# Patient Record
Sex: FEMALE | Race: WHITE | NOT HISPANIC OR LATINO | Employment: OTHER | URBAN - METROPOLITAN AREA
[De-identification: names, ages, dates, MRNs, and addresses within clinical notes are randomized per-mention and may not be internally consistent; named-entity substitution may affect disease eponyms.]

---

## 2018-01-02 ENCOUNTER — APPOINTMENT (OUTPATIENT)
Dept: ULTRASOUND IMAGING | Facility: CLINIC | Age: 83
End: 2018-01-02
Attending: EMERGENCY MEDICINE
Payer: MEDICARE

## 2018-01-02 ENCOUNTER — HOSPITAL ENCOUNTER (EMERGENCY)
Facility: CLINIC | Age: 83
Discharge: HOME | End: 2018-01-02
Attending: EMERGENCY MEDICINE | Admitting: EMERGENCY MEDICINE
Payer: MEDICARE

## 2018-01-02 VITALS
OXYGEN SATURATION: 97 % | RESPIRATION RATE: 16 BRPM | HEART RATE: 87 BPM | TEMPERATURE: 97.6 F | SYSTOLIC BLOOD PRESSURE: 177 MMHG | DIASTOLIC BLOOD PRESSURE: 82 MMHG

## 2018-01-02 DIAGNOSIS — R60.0 PERIPHERAL EDEMA: ICD-10-CM

## 2018-01-02 PROCEDURE — 93971 EXTREMITY STUDY: CPT | Mod: RT

## 2018-01-02 PROCEDURE — 99284 EMERGENCY DEPT VISIT MOD MDM: CPT | Mod: 25

## 2018-01-02 RX ORDER — FUROSEMIDE 20 MG
20 TABLET ORAL DAILY
Qty: 14 TABLET | Refills: 0 | Status: SHIPPED | OUTPATIENT
Start: 2018-01-02 | End: 2023-01-01

## 2018-01-02 ASSESSMENT — ENCOUNTER SYMPTOMS
COLOR CHANGE: 1
SHORTNESS OF BREATH: 0

## 2018-01-02 NOTE — ED AVS SNAPSHOT
Emergency Department    [address] [place] [address]    Phone:  [phone]    Fax:  [phone]                                       Breanna Her   MRN: [id number]    Department:   Emergency Department   Date of Visit:  1/2/2018           After Visit Summary Signature Page     I have received my discharge instructions, and my questions have been answered. I have discussed any challenges I see with this plan with the nurse or doctor.    ..........................................................................................................................................  Patient/Patient Representative Signature      ..........................................................................................................................................  Patient Representative Print Name and Relationship to Patient    ..................................................               ................................................  Date                                            Time    ..........................................................................................................................................  Reviewed by Signature/Title    ...................................................              ..............................................  Date                                                            Time

## 2018-01-02 NOTE — ED AVS SNAPSHOT
Emergency Department    [address] [facility] [address]    Phone:  [phone]    Fax:  [phone]                                       Breanna Her   MRN: [id number]    Department:   Emergency Department   Date of Visit:  1/2/2018           Patient Information     Date Of Birth          4/13/1924        Your diagnoses for this visit were:     Peripheral edema        You were seen by Jessica Haji MD.      Follow-up Information     Schedule an appointment as soon as possible for a visit with Clinic, [facility].    Contact information:    [address] [address] [phone]          Discharge Instructions       Cut out the salt in your diet, elevate your legs when not walking, Lasix 20 mg in the morning until you see your doctor.  Set an appointment up either Friday or Monday for a recheck.  If you develop fevers with increasing redness, you should be rechecked.        Leg Swelling in Both Legs    Swelling of the feet, ankles, and legs is called edema. It is caused by excess fluid that has collected in the tissues. Extra fluid in the body settles in the lowest part because of gravity. This is why the legs and feet are most affected.  Some of the causes for edema include:    Disease of the heart like congestive heart failure    Standing or sitting for long periods of time    Infection of the feet or legs    Blood pooling in the veins of your legs (venous insufficiency)    Dilated veins in your lower leg (varicose veins)    Garters or other clothing that is tight on your legs. This will cause blood to pool in your legs because the clothing limits blood flow.    Some medicines such as hormones like birth control pills, some blood pressure medicines like calcium channel blockers (amlodipine) and steroids, some antidepressants like MAO inhibitors and tricyclics    Menstrual periods that cause you to retain fluids    Many types of renal disease    Liver  failure or cirrhosis    Pregnancy, some swelling is normal, but a sudden increase in leg swelling or weight gain can be a sign of a dangerous complication of pregnancy    Poor nutrition    Thyroid disease  Medical treatment will depend on what is causing the swelling in your legs. Your healthcare provider may prescribe water pills (diuretics) to get rid of the extra fluid.  Home care  Follow these guidelines when caring for yourself at home:    Don't wear clothing like garters that is tight on your legs.    Keep your legs up while lying or sitting.    If infection, injury, or recent surgery is causing the swelling, stay off your legs as much as possible until symptoms get better.    If your healthcare provider says that your leg swelling is caused by venous insufficiency or varicose veins, don't sit or  one place for long periods of time. Take breaks and walk about every few hours. Brisk walking is a good exercise. It helps circulate the blood that has collected in your leg. Talk with your provider about using support stockings to stop daytime leg swelling.    If your provider says that heart disease is causing your leg swelling, follow a low-salt diet to stop extra fluid from staying in your body. You may also need medicine.  Follow-up care  Follow up with your healthcare provider, or as advised.  When to seek medical advice  Call your healthcare provider right away if any of these occur:    New shortness of breath or chest pain    Shortness of breath or chest pain that gets worse    Swelling in both legs or ankles that gets worse    Swelling of the abdomen    Redness, warmth, or swelling in one leg    Fever of 100.4 F (38 C) or higher, or as directed by your healthcare provider    Yellow color to your skin or eyes    Rapid, unexplained weight gain    Having to sleep upright or use an increased number of pillows  Date Last Reviewed: 3/31/2016    8656-9397 [employer]. [address] [facility],  [place] [address]. All rights reserved. This information is not intended as a substitute for professional medical care. Always follow your healthcare professional's instructions.          24 Hour Appointment Hotline       To make an appointment at any [facility], call [phone] ([phone]). If you don't have a family doctor or clinic, we will help you find one. [facility] clinics are conveniently located to serve the needs of you and your family.             Review of your medicines      CONTINUE these medicines which may have CHANGED, or have new prescriptions. If we are uncertain of the size of tablets/capsules you have at home, strength may be listed as something that might have changed.        Dose / Directions Last dose taken    * LASIX PO   Dose:  20 mg   What changed:  Another medication with the same name was added. Make sure you understand how and when to take each.        Take 20 mg by mouth daily Pt takes on prn basis for edema   Refills:  0        * furosemide 20 MG tablet   Commonly known as:  LASIX   Dose:  20 mg   What changed:  You were already taking a medication with the same name, and this prescription was added. Make sure you understand how and when to take each.   Quantity:  14 tablet        Take 1 tablet (20 mg) by mouth daily   Refills:  0        * Notice:  This list has 2 medication(s) that are the same as other medications prescribed for you. Read the directions carefully, and ask your doctor or other care provider to review them with you.      Our records show that you are taking the medicines listed below. If these are incorrect, please call your family doctor or clinic.        Dose / Directions Last dose taken    ASPIRIN PO   Dose:  81 mg        Take 81 mg by mouth daily.   Refills:  0        FERROUS SULFATE   Dose:  65 mg        65 mg daily.   Refills:  0        HYDROcodone-acetaminophen 5-325 MG per tablet   Commonly known as:  NORCO   Dose:  1 tablet   Quantity:  15 tablet  "       Take 1 tablet by mouth every 6 hours as needed for moderate to severe pain   Refills:  0        LISINOPRIL PO   Dose:  20 mg        Take 20 mg by mouth daily.   Refills:  0        Multi-vitamin Tabs tablet   Dose:  1 tablet        Take 1 tablet by mouth daily.   Refills:  0        nitroFURantoin (macrocrystal-monohydrate) 100 MG capsule   Commonly known as:  MACROBID   Dose:  100 mg   Quantity:  6 capsule        Take 1 capsule (100 mg) by mouth 2 times daily   Refills:  0        oxybutynin 3.9 MG/24HR BIW patch   Commonly known as:  OXYTROL   Dose:  1 patch        Place 1 patch onto the skin twice a week.   Refills:  0        pentosan polysulfate 100 MG capsule   Commonly known as:  ELMIRON   Dose:  100 mg   Quantity:  60 capsule        Take 1 capsule (100 mg) by mouth 2 times daily   Refills:  3        PRILOSEC PO   Dose:  20 mg        Take 20 mg by mouth every morning.   Refills:  0        PSYLLIUM PO   Dose:  1 tsp.        Take 1 tsp by mouth daily as needed.   Refills:  0        REQUIP PO   Dose:  3 mg        Take 3 mg by mouth   Refills:  0        senna-docusate 8.6-50 MG per tablet   Commonly known as:  SENOKOT-S;PERICOLACE   Dose:  1 tablet        Take 1 tablet by mouth daily.   Refills:  0        TENORMIN PO   Dose:  50 mg        Take 50 mg by mouth 2 times daily.   Refills:  0        TRAMADOL HCL PO   Dose:  50 mg        Take 50 mg by mouth every 6 hours as needed for moderate to severe pain   Refills:  0        TRIMETHOPRIM PO   Dose:  100 mg        Take 100 mg by mouth daily.   Refills:  0        UNKNOWN TO PATIENT        \"low dose antibiotic for UTI   Refills:  0                Prescriptions were sent or printed at these locations (1 Prescription)                   Other Prescriptions                Printed at Department/Unit printer (1 of 1)         furosemide (LASIX) 20 MG tablet                Procedures and tests performed during your visit     US Lower Extremity Venous Duplex Right    "   Orders Needing Specimen Collection     None      Pending Results     No orders found from 12/31/2017 to 1/3/2018.            Pending Culture Results     No orders found from 12/31/2017 to 1/3/2018.            Pending Results Instructions     If you had any lab results that were not finalized at the time of your Discharge, you can call the ED Lab Result RN at [phone]. You will be contacted by this team for any positive Lab results or changes in treatment. The nurses are available 7 days a week from 10A to 6:30P.  You can leave a message 24 hours per day and they will return your call.        Test Results From Your Hospital Stay        1/2/2018  9:56 PM      Narrative     US LOWER EXTREMITY VENOUS DUPLEX RIGHT   1/2/2018 9:54 PM     HISTORY: Right leg pain, swelling and redness.    COMPARISON: None.    FINDINGS: Gray-scale, color and Doppler spectral analysis ultrasound  was performed of the right leg. Compression and augmentation imaging  was performed.    There is no evidence for deep venous thrombosis. The veins compress  and augment normally.        Impression     IMPRESSION: No DVT.     ANDREA POWELL MD                Clinical Quality Measure: Blood Pressure Screening     Your blood pressure was checked while you were in the emergency department today. The last reading we obtained was  BP: 169/78 . Please read the guidelines below about what these numbers mean and what you should do about them.  If your systolic blood pressure (the top number) is less than 120 and your diastolic blood pressure (the bottom number) is less than 80, then your blood pressure is normal. There is nothing more that you need to do about it.  If your systolic blood pressure (the top number) is 120-139 or your diastolic blood pressure (the bottom number) is 80-89, your blood pressure may be higher than it should be. You should have your blood pressure rechecked within a year by a primary care provider.  If your systolic blood  "pressure (the top number) is 140 or greater or your diastolic blood pressure (the bottom number) is 90 or greater, you may have high blood pressure. High blood pressure is treatable, but if left untreated over time it can put you at risk for heart attack, stroke, or kidney failure. You should have your blood pressure rechecked by a primary care provider within the next 4 weeks.  If your provider in the emergency department today gave you specific instructions to follow-up with your doctor or provider even sooner than that, you should follow that instruction and not wait for up to 4 weeks for your follow-up visit.        Thank you for choosing [facility]       Thank you for choosing [facility] for your care. Our goal is always to provide you with excellent care. Hearing back from our patients is one way we can continue to improve our services. Please take a few minutes to complete the written survey that you may receive in the mail after you visit with us. Thank you!        [email or web address] Information     [employer] lets you send messages to your doctor, view your test results, renew your prescriptions, schedule appointments and more. To sign up, go to [email or web address] . Click on \"Log in\" on the left side of the screen, which will take you to the Welcome page. Then click on \"Sign up Now\" on the right side of the page.     You will be asked to enter the access code listed below, as well as some personal information. Please follow the directions to create your username and password.     Your access code is: [email or web address]  Expires: 2018 10:12 PM     Your access code will  in 90 days. If you need help or a new code, please call your [facility] clinic or [phone].        Care EveryWhere ID     This is your Care EveryWhere ID. This could be used by other organizations to access your [facility] medical records  [id number]        Equal Access to Services     CARLEY LAZO AH: atif Gonzalez, " anna martinez ah. So [facility] [phone].    ATENCIÓN: Si habla greciaañol, tiene a borjas disposición servicios gratuitos de asistencia lingüística. Llame al [phone].    We comply with applicable federal civil rights laws and [place] laws. We do not discriminate on the basis of race, color, national origin, age, disability, sex, sexual orientation, or gender identity.            After Visit Summary       This is your record. Keep this with you and show to your community pharmacist(s) and doctor(s) at your next visit.

## 2018-01-03 NOTE — DISCHARGE INSTRUCTIONS
Cut out the salt in your diet, elevate your legs when not walking, Lasix 20 mg in the morning until you see your doctor.  Set an appointment up either Friday or Monday for a recheck.  If you develop fevers with increasing redness, you should be rechecked.        Leg Swelling in Both Legs    Swelling of the feet, ankles, and legs is called edema. It is caused by excess fluid that has collected in the tissues. Extra fluid in the body settles in the lowest part because of gravity. This is why the legs and feet are most affected.  Some of the causes for edema include:    Disease of the heart like congestive heart failure    Standing or sitting for long periods of time    Infection of the feet or legs    Blood pooling in the veins of your legs (venous insufficiency)    Dilated veins in your lower leg (varicose veins)    Garters or other clothing that is tight on your legs. This will cause blood to pool in your legs because the clothing limits blood flow.    Some medicines such as hormones like birth control pills, some blood pressure medicines like calcium channel blockers (amlodipine) and steroids, some antidepressants like MAO inhibitors and tricyclics    Menstrual periods that cause you to retain fluids    Many types of renal disease    Liver failure or cirrhosis    Pregnancy, some swelling is normal, but a sudden increase in leg swelling or weight gain can be a sign of a dangerous complication of pregnancy    Poor nutrition    Thyroid disease  Medical treatment will depend on what is causing the swelling in your legs. Your healthcare provider may prescribe water pills (diuretics) to get rid of the extra fluid.  Home care  Follow these guidelines when caring for yourself at home:    Don't wear clothing like garters that is tight on your legs.    Keep your legs up while lying or sitting.    If infection, injury, or recent surgery is causing the swelling, stay off your legs as much as possible until symptoms get  better.    If your healthcare provider says that your leg swelling is caused by venous insufficiency or varicose veins, don't sit or  one place for long periods of time. Take breaks and walk about every few hours. Brisk walking is a good exercise. It helps circulate the blood that has collected in your leg. Talk with your provider about using support stockings to stop daytime leg swelling.    If your provider says that heart disease is causing your leg swelling, follow a low-salt diet to stop extra fluid from staying in your body. You may also need medicine.  Follow-up care  Follow up with your healthcare provider, or as advised.  When to seek medical advice  Call your healthcare provider right away if any of these occur:    New shortness of breath or chest pain    Shortness of breath or chest pain that gets worse    Swelling in both legs or ankles that gets worse    Swelling of the abdomen    Redness, warmth, or swelling in one leg    Fever of 100.4 F (38 C) or higher, or as directed by your healthcare provider    Yellow color to your skin or eyes    Rapid, unexplained weight gain    Having to sleep upright or use an increased number of pillows  Date Last Reviewed: 3/31/2016    [id number] [employer]. [address] [place] [address], [place] [address]. All rights reserved. This information is not intended as a substitute for professional medical care. Always follow your healthcare professional's instructions.

## 2018-01-03 NOTE — ED PROVIDER NOTES
History     Chief Complaint:  Leg Swelling      HPI   Breanna Her is a 89 year old female who presents with leg swelling. The patient states that she has been experiencing increased bilateral lower extremity edema for the past couple of weeks, right greater than left. She denies chest pain or shortness of breath.  She does have a history of peripheral edema and had been on Lasix as needed but does not have any at home.  No history of congestive heart failure.  She has not had fever, she does have erythema on both shins but says this is chronic and not new are changed.  She admits to eating more salt in her diet over the holidays.  No history of PE or DVT.    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  Osteoarthrosis, lower leg  Osteoporosis  Restless leg  Self-catheterises urinary bladder  Spinal stenosis, lumbar region, without neurogenic claudication  Stroke  Unspecified essential hypertension  Unspecified urinary incontinence  Lower urinary tract infection    Past Surgical History:    Appendectomy  Cataract iol, right/left  Cholecystectomy  Cystocele repair  Cystoscopy, biopsy bladder, combined  Cystoscopy, retrogrades, combined  Genitourinary surgery- interstim implant  Hysterectomy  Implant stimulator and leads sacral nerve (stage 1 and 2)  Laminectomy lumbar one level    Family History:    History reviewed. No significant family history.     Social History:  Relationship status:   Tobacco Use: Neg (Former Smoker, Quit Date: 1/1/1984)  Alcohol Use: Pos (Rarely)  The patient presents with her friend.  The patient is retired.  The patient lives at home with her son.    Review of Systems   Respiratory: Negative  for shortness of breath.    Cardiovascular: Positive for leg swelling. Negative for chest pain.   Skin: Positive for color change (Redness on bilateral lower legs.).   All other systems reviewed and are negative.    Physical Exam     Patient Vitals for the past 24 hrs:   BP Temp Temp src Pulse Resp SpO2   01/02/18 2219 177/82 - - - - -   01/02/18 2215 - - - - - 97 %   01/02/18 2130 (!) 138/93 - - - - 95 %   01/02/18 1711 169/78 97.6  F (36.4  C) Oral 87 16 99 %     Physical Exam  I reviewed the patient's vital signs and nursing notes.  General: The patient is awake and alert very pleasant.  Pulmonary: Lungs are clear.  Effort is normal.  No wheezing.  Cardiovascular: Heart sounds are normal, good peripheral pulses.  Normal capillary refill in her feet.  Perfusion is normal.  Musculoskeletal: She has 1+ edema in the left lower extremity below the knee and 2+ in the right.  There is some hyperemia of the skin anteriorly bilaterally.  She has tenderness of the skin but the skin is not excessively warm.  No red streaking up the leg.  Neuro: Awake and alert.  Sensation is normal and intact in the lower extremity.  Mentation is normal.  Skin: Hyperemia over the anterior shins bilaterally but not hot.  No other rash noted.    Emergency Department Course   Imaging:  Radiographic findings were communicated with the patient who voiced understanding of the findings.    Right Lower Extremity US, Venous Duplex, per radiology:   No DVT.    Emergency Department Course:  Nursing notes and vitals reviewed.  I performed an exam of the patient as documented above.  The above workup was undertaken.  2206: I asked the patient a follow up question regarding medications that she is taking.  Findings and plan explained to the Patient. Patient discharged home, status improved, with instructions regarding supportive care, medications, and reasons to return as well as the importance of close follow-up was reviewed.    Impression & Plan     Medical Decision Making:  Patient comes in with a history of intermittent leg edema.  The right leg seem to be bigger recently than the left so an ultrasound was obtained and it was negative.  She has hyperemia probably due to the edema but no evidence of infection.  She had a history of being on Lasix as needed so I am going to refill that and have her cut the salt out of her diet.  Over the holidays it sounds like she probably ate more salt than normal.  She will follow-up in the clinic later this week or early next week for a recheck.    Diagnosis:    ICD-10-CM   1. Peripheral edema R60.9     Disposition:  Discharged to home with Lasix.    Discharge Medications:   furosemide (LASIX) 20 MG tablet Take 1 tablet (20 mg) by mouth daily, Disp-14 tablet, R-0, Local Print     Alem HAMMOND, am serving as a scribe on 1/2/2018 at 9:57 PM to personally document services performed by Jessica Haji MD, based on my observations and the provider's statements to me.     EMERGENCY DEPARTMENT       Jessica Haji MD  01/02/18 4015

## 2018-05-08 ENCOUNTER — HOSPITAL ENCOUNTER (EMERGENCY)
Facility: CLINIC | Age: 83
Discharge: HOME | End: 2018-05-08
Attending: EMERGENCY MEDICINE | Admitting: EMERGENCY MEDICINE
Payer: MEDICARE

## 2018-05-08 ENCOUNTER — APPOINTMENT (OUTPATIENT)
Dept: GENERAL RADIOLOGY | Facility: CLINIC | Age: 83
End: 2018-05-08
Attending: EMERGENCY MEDICINE
Payer: MEDICARE

## 2018-05-08 VITALS
SYSTOLIC BLOOD PRESSURE: 151 MMHG | RESPIRATION RATE: 16 BRPM | TEMPERATURE: 98.4 F | OXYGEN SATURATION: 96 % | HEIGHT: 62 IN | DIASTOLIC BLOOD PRESSURE: 82 MMHG | BODY MASS INDEX: 20.43 KG/M2 | WEIGHT: 111 LBS

## 2018-05-08 DIAGNOSIS — R07.9 CHEST PAIN, UNSPECIFIED TYPE: ICD-10-CM

## 2018-05-08 LAB
ANION GAP SERPL CALCULATED.3IONS-SCNC: 4 MMOL/L (ref 3–14)
BASOPHILS # BLD AUTO: 0 10E9/L (ref 0–0.2)
BASOPHILS NFR BLD AUTO: 0.2 %
BUN SERPL-MCNC: 19 MG/DL (ref 7–30)
CALCIUM SERPL-MCNC: 8.8 MG/DL (ref 8.5–10.1)
CHLORIDE SERPL-SCNC: 109 MMOL/L (ref 94–109)
CO2 SERPL-SCNC: 27 MMOL/L (ref 20–32)
CREAT SERPL-MCNC: 0.78 MG/DL (ref 0.52–1.04)
DIFFERENTIAL METHOD BLD: ABNORMAL
EOSINOPHIL # BLD AUTO: 0.1 10E9/L (ref 0–0.7)
EOSINOPHIL NFR BLD AUTO: 1.3 %
ERYTHROCYTE [DISTWIDTH] IN BLOOD BY AUTOMATED COUNT: 15.1 % (ref 10–15)
GFR SERPL CREATININE-BSD FRML MDRD: 68 ML/MIN/1.7M2
GLUCOSE SERPL-MCNC: 86 MG/DL (ref 70–99)
HCT VFR BLD AUTO: 34.4 % (ref 35–47)
HGB BLD-MCNC: 11.3 G/DL (ref 11.7–15.7)
IMM GRANULOCYTES # BLD: 0 10E9/L (ref 0–0.4)
IMM GRANULOCYTES NFR BLD: 0.2 %
INTERPRETATION ECG - MUSE: NORMAL
LYMPHOCYTES # BLD AUTO: 1.7 10E9/L (ref 0.8–5.3)
LYMPHOCYTES NFR BLD AUTO: 26.8 %
MCH RBC QN AUTO: 31.7 PG (ref 26.5–33)
MCHC RBC AUTO-ENTMCNC: 32.8 G/DL (ref 31.5–36.5)
MCV RBC AUTO: 97 FL (ref 78–100)
MONOCYTES # BLD AUTO: 0.5 10E9/L (ref 0–1.3)
MONOCYTES NFR BLD AUTO: 8.4 %
NEUTROPHILS # BLD AUTO: 3.9 10E9/L (ref 1.6–8.3)
NEUTROPHILS NFR BLD AUTO: 63.1 %
NRBC # BLD AUTO: 0 10*3/UL
NRBC BLD AUTO-RTO: 0 /100
PLATELET # BLD AUTO: 180 10E9/L (ref 150–450)
POTASSIUM SERPL-SCNC: 4.7 MMOL/L (ref 3.4–5.3)
RBC # BLD AUTO: 3.56 10E12/L (ref 3.8–5.2)
SODIUM SERPL-SCNC: 140 MMOL/L (ref 133–144)
TROPONIN I SERPL-MCNC: <0.015 UG/L (ref 0–0.04)
WBC # BLD AUTO: 6.2 10E9/L (ref 4–11)

## 2018-05-08 PROCEDURE — 93005 ELECTROCARDIOGRAM TRACING: CPT

## 2018-05-08 PROCEDURE — A9270 NON-COVERED ITEM OR SERVICE: HCPCS | Mod: GY | Performed by: EMERGENCY MEDICINE

## 2018-05-08 PROCEDURE — 71046 X-RAY EXAM CHEST 2 VIEWS: CPT

## 2018-05-08 PROCEDURE — 84484 ASSAY OF TROPONIN QUANT: CPT | Performed by: EMERGENCY MEDICINE

## 2018-05-08 PROCEDURE — 25000132 ZZH RX MED GY IP 250 OP 250 PS 637: Mod: GY | Performed by: EMERGENCY MEDICINE

## 2018-05-08 PROCEDURE — 85025 COMPLETE CBC W/AUTO DIFF WBC: CPT | Performed by: EMERGENCY MEDICINE

## 2018-05-08 PROCEDURE — 80048 BASIC METABOLIC PNL TOTAL CA: CPT | Performed by: EMERGENCY MEDICINE

## 2018-05-08 PROCEDURE — 99285 EMERGENCY DEPT VISIT HI MDM: CPT | Mod: 25

## 2018-05-08 RX ORDER — ASPIRIN 81 MG/1
162 TABLET, CHEWABLE ORAL ONCE
Status: COMPLETED | OUTPATIENT
Start: 2018-05-08 | End: 2018-05-08

## 2018-05-08 RX ADMIN — ASPIRIN 81 MG 162 MG: 81 TABLET ORAL at 17:00

## 2018-05-08 ASSESSMENT — ENCOUNTER SYMPTOMS
DIAPHORESIS: 0
SHORTNESS OF BREATH: 0
FEVER: 0
NAUSEA: 0

## 2018-05-08 NOTE — ED PROVIDER NOTES
"  History     Chief Complaint:  Jaw Pain    The history is provided by the patient.      Breanna Her is a 89 year old female who presents to the emergency department today for evaluation of jaw pain. Waking up this morning, the patient felt fine and had no symptoms. Around 1300, prior to doing PT, the patient began having a funny feeling in her chest and pain in her upper jaw/teeth. The nurse thought she looked somewhat pale, and so she took 2 nitroglycerin, which also resolved her symptoms. She was told to go to  however, who performed an ECG and sent her here to the emergency department. She denies shortness of breath, nausea, and sweating, and has had no stents placed in her heart.     Allergies:  Penicillins     Medications:    Aspirin PO - not currently taking daily as of 05/08/18  Nitroglycerin    Past Medical History:    Hx of UTIs  Heart problems  Kidney infection  Anemia  Stent between bladder and kidneys    Past Surgical History:    History reviewed. No pertinent surgical history.    Family History:    History reviewed. No pertinent family history.    Social History:  The patient was accompanied to the ED by her daughter.  Smoking Status: Former Smoker  Alcohol Use: Positive - rare  Marital Status:       Review of Systems   Constitutional: Negative for diaphoresis and fever.   HENT:        Jaw pain - upper   Respiratory: Negative for shortness of breath.    Cardiovascular: Positive for chest pain (resolved).   Gastrointestinal: Negative for nausea.   All other systems reviewed and are negative.    Physical Exam     Patient Vitals for the past 24 hrs:   BP Temp Temp src Heart Rate Resp SpO2 Height Weight   05/08/18 1733 - - - 74 16 96 % - -   05/08/18 1600 151/82 98.4  F (36.9  C) Oral 73 18 95 % 1.575 m (5' 2\") 50.3 kg (111 lb)      Physical Exam  General: Appears well-developed and well-nourished.   Head: No signs of trauma.   CV: Normal rate and regular rhythm.    Resp: Effort normal and " breath sounds normal. No respiratory distress.   GI: Soft. There is no tenderness.  No rebound or guarding.  Normal bowel sounds.  No CVA tenderness.  MSK: Normal range of motion. no edema. No Calf tenderness.  Neuro: The patient is alert and oriented.  Speech normal.  GCS 15  Skin: Skin is warm and dry. No rash noted.   Psych: normal mood and affect. behavior is normal.       Emergency Department Course     ECG:  ECG taken at 1553, ECG read at 1604  Normal sinus rhythm  Possible left atrial enlargement  Borderline ECG  Rate 77 bpm. NJ interval 148 ms. QRS duration 80 ms. QT/QTc 396/448 ms. P-R-T axes 57 -29 45.    Imaging:  Radiology findings were communicated with the patient who voiced understanding of the findings.    Chest XR,  PA & LAT  1. No active areas of infiltrate.  2. Thoracic compression fractures of indeterminate age.      Reading per radiology    Laboratory:  Laboratory findings were communicated with the patient who voiced understanding of the findings.    CBC: WBC 6.2, HGB 11.3,   BMP: o/w WNL (Creatinine 0.78)  Troponin I (1622): <0.015    Interventions:  1700 Aspirin 162 mg PO    Emergency Department Course:    1600 Nursing notes and vitals reviewed.    1605 I performed an exam of the patient as documented above.     1618 The patient was sent for a XR while in the emergency department, results above.      1623 IV was inserted and blood was drawn for laboratory testing, results above.     1715 I personally reviewed the imaging and lab results with the patient and answered all related questions prior to discharge.    Impression & Plan      Medical Decision Making:  Breanna Her is a 89 year old female who presents to the emergency department today for evaluation of chest pain.  This afternoon she developed some pain in the chest along with some sensation around her mouth.  She had a home health nurse at the time recommend that she come to the hospital.  She had actually gone to urgent  care first and sent her to the ER.  On my evaluation, patient said that she was actually symptom-free.  Her exam was very benign.  EKG is obtained did not show any concerning ST segment changes.  Blood work was obtained and showed a negative troponin.  Chest x-ray was unremarkable.  Patient continued to be symptom-free throughout her time the ER.  I did discuss with the patient and her daughter disposition options including admission for further cardiac evaluation given her age and symptoms.  Patient very much declined this.  She understood that while the workup was reassuring, cannot completely rule out ACS.  Patient is an excellent historian and has full decision-making capacity.  She was ultimately discharged home with her daughter with instructions to follow the primary care doctor and return to the ER if she has return of symptoms or any further concerns.      Diagnosis:    ICD-10-CM    1. Chest pain, unspecified type R07.9      Disposition:   Discharge    Scribe Disclosure:  STEPHANY, Modesto Cristofer, am serving as a scribe at 3:52 PM on 5/8/2018 to document services personally performed by Griffin Kc MD based on my observations and the provider's statements to me.      EMERGENCY DEPARTMENT       Griffin Kc MD  05/08/18 8562

## 2018-05-08 NOTE — ED AVS SNAPSHOT
Emergency Department    [address] [place] [address]    Phone:  [phone]    Fax:  [phone]                                       Breanna Her   MRN: [id number]    Department:   Emergency Department   Date of Visit:  5/8/2018           After Visit Summary Signature Page     I have received my discharge instructions, and my questions have been answered. I have discussed any challenges I see with this plan with the nurse or doctor.    ..........................................................................................................................................  Patient/Patient Representative Signature      ..........................................................................................................................................  Patient Representative Print Name and Relationship to Patient    ..................................................               ................................................  Date                                            Time    ..........................................................................................................................................  Reviewed by Signature/Title    ...................................................              ..............................................  Date                                                            Time

## 2018-05-08 NOTE — ED AVS SNAPSHOT
Emergency Department    [address] [facility] [address]    Phone:  [phone]    Fax:  [phone]                                       Breanna Her   MRN: [id number]    Department:   Emergency Department   Date of Visit:  5/8/2018           Patient Information     Date Of Birth          4/13/1924        Your diagnoses for this visit were:     Chest pain, unspecified type        You were seen by Griffin Kc MD.      Follow-up Information     [facility].    Contact information:    [address] [place] [address] [phone]          Follow up with  Emergency Department.    Specialty:  EMERGENCY MEDICINE    Why:  As needed, If symptoms worsen    Contact information:    [address] [facility] [id number] [phone]        Discharge Instructions       Discharge Instructions  Chest Pain    You have been seen today for chest pain or discomfort.  At this time, your doctor has found no signs that your chest pain is due to a serious or life-threatening condition, (or you have declined more testing and/or admission to the hospital). However, sometimes there is a serious problem that does not show up right away. Your evaluation today may not be complete and you may need further testing and evaluation.     You need to follow-up with your regular doctor within 3 days.    Return to the Emergency Department if:    Your chest pain changes, gets worse, starts to happen more often, or comes with less activity.    You are short of breath.    You get very weak or tired.    You pass out or faint.    You have any new symptoms, like fever, cough, numb legs, or you cough up blood.    You have anything else that worries you.    Until you follow-up with your regular doctor please do the following:    Take one aspirin daily unless you have an allergy or are told not to by your doctor.    If a stress test appointment has been made, go to the  appointment.    If you have questions, contact your regular doctor.    If your doctor today has told you to follow-up with your regular doctor, it is very important that you make an appointment with your clinic and go to the appointment.  If you do not follow-up with your primary doctor, it may result in missing an important development which could result in permanent injury or disability and/or lasting pain.  If there is any problem keeping your appointment, call your doctor or return to the Emergency Department.    If you were given a prescription for medicine here today, be sure to read all of the information (including the package insert) that comes with your prescription.  This will include important information about the medicine, its side effects, and any warnings that you need to know about.  The pharmacist who fills the prescription can provide more information and answer questions you may have about the medicine.  If you have questions or concerns that the pharmacist cannot address, please call or return to the Emergency Department.     Opioid Medication Information    Pain medications are among the most commonly prescribed medicines, so we are including this information for all our patients. If you did not receive pain medication or get a prescription for pain medicine, you can ignore it.     You may have been given a prescription for an opioid (narcotic) pain medicine and/or have received a pain medicine while here in the Emergency Department. These medicines can make you drowsy or impaired. You must not drive, operate dangerous equipment, or engage in any other dangerous activities while taking these medications. If you drive while taking these medications, you could be arrested for DUI, or driving under the influence. Do not drink any alcohol while you are taking these medications.     Opioid pain medications can cause addiction. If you have a history of chemical dependency of any type, you are at a  higher risk of becoming addicted to pain medications.  Only take these prescribed medications to treat your pain when all other options have been tried. Take it for as short a time and as few doses as possible. Store your pain pills in a secure place, as they are frequently stolen and provide a dangerous opportunity for children or visitors in your house to start abusing these powerful medications. We will not replace any lost or stolen medicine.  As soon as your pain is better, you should flush all your remaining medication.     Many prescription pain medications contain Tylenol  (acetaminophen), including Vicodin , Tylenol #3 , Norco , Lortab , and Percocet .  You should not take any extra pills of Tylenol  if you are using these prescription medications or you can get very sick.  Do not ever take more than 3000 mg of acetaminophen in any 24 hour period.    All opioids tend to cause constipation. Drink plenty of water and eat foods that have a lot of fiber, such as fruits, vegetables, prune juice, apple juice and high fiber cereal.  Take a laxative if you don t move your bowels at least every other day. Miralax , Milk of Magnesia, Colace , or Senna  can be used to keep you regular.      Remember that you can always come back to the Emergency Department if you are not able to see your regular doctor in the amount of time listed above, if you get any new symptoms, or if there is anything that worries you.          24 Hour Appointment Hotline       To make an appointment at any [facility], call [phone] ([phone]). If you don't have a family doctor or clinic, we will help you find one. [facility] clinics are conveniently located to serve the needs of you and your family.             Review of your medicines      Our records show that you are taking the medicines listed below. If these are incorrect, please call your family doctor or clinic.        Dose / Directions Last dose taken    ASPIRIN PO   Dose:   81 mg        Take 81 mg by mouth daily.   Refills:  0        FERROUS SULFATE   Dose:  65 mg        65 mg daily.   Refills:  0        HYDROcodone-acetaminophen 5-325 MG per tablet   Commonly known as:  NORCO   Dose:  1 tablet   Quantity:  15 tablet        Take 1 tablet by mouth every 6 hours as needed for moderate to severe pain   Refills:  0        * LASIX PO   Dose:  20 mg        Take 20 mg by mouth daily Pt takes on prn basis for edema   Refills:  0        * furosemide 20 MG tablet   Commonly known as:  LASIX   Dose:  20 mg   Quantity:  14 tablet        Take 1 tablet (20 mg) by mouth daily   Refills:  0        LISINOPRIL PO   Dose:  20 mg        Take 20 mg by mouth daily.   Refills:  0        Multi-vitamin Tabs tablet   Dose:  1 tablet        Take 1 tablet by mouth daily.   Refills:  0        nitroFURantoin (macrocrystal-monohydrate) 100 MG capsule   Commonly known as:  MACROBID   Dose:  100 mg   Quantity:  6 capsule        Take 1 capsule (100 mg) by mouth 2 times daily   Refills:  0        oxybutynin 3.9 MG/24HR BIW patch   Commonly known as:  OXYTROL   Dose:  1 patch        Place 1 patch onto the skin twice a week.   Refills:  0        pentosan polysulfate 100 MG capsule   Commonly known as:  ELMIRON   Dose:  100 mg   Quantity:  60 capsule        Take 1 capsule (100 mg) by mouth 2 times daily   Refills:  3        PRILOSEC PO   Dose:  20 mg        Take 20 mg by mouth every morning.   Refills:  0        PSYLLIUM PO   Dose:  1 tsp.        Take 1 tsp by mouth daily as needed.   Refills:  0        REQUIP PO   Dose:  3 mg        Take 3 mg by mouth   Refills:  0        senna-docusate 8.6-50 MG per tablet   Commonly known as:  SENOKOT-S;PERICOLACE   Dose:  1 tablet        Take 1 tablet by mouth daily.   Refills:  0        TENORMIN PO   Dose:  50 mg        Take 50 mg by mouth 2 times daily.   Refills:  0        TRAMADOL HCL PO   Dose:  50 mg        Take 50 mg by mouth every 6 hours as needed for moderate to severe pain  "  Refills:  0        TRIMETHOPRIM PO   Dose:  100 mg        Take 100 mg by mouth daily.   Refills:  0        UNKNOWN TO PATIENT        \"low dose antibiotic for UTI   Refills:  0        * Notice:  This list has 2 medication(s) that are the same as other medications prescribed for you. Read the directions carefully, and ask your doctor or other care provider to review them with you.            Procedures and tests performed during your visit     Basic metabolic panel    CBC with platelets + differential    Chest XR,  PA & LAT    EKG 12-lead, tracing only    Troponin I (now)      Orders Needing Specimen Collection     None      Pending Results     Date and Time Order Name Status Description    5/8/2018 1618 Chest XR,  PA & LAT Preliminary             Pending Culture Results     No orders found from 5/6/2018 to 5/9/2018.            Pending Results Instructions     If you had any lab results that were not finalized at the time of your Discharge, you can call the ED Lab Result RN at [phone]. You will be contacted by this team for any positive Lab results or changes in treatment. The nurses are available 7 days a week from 10A to 6:30P.  You can leave a message 24 hours per day and they will return your call.        Test Results From Your Hospital Stay        5/8/2018  4:34 PM      Component Results     Component Value Ref Range & Units Status    WBC 6.2 4.0 - 11.0 10e9/L Final    RBC Count 3.56 (L) 3.8 - 5.2 10e12/L Final    Hemoglobin 11.3 (L) 11.7 - 15.7 g/dL Final    Hematocrit 34.4 (L) 35.0 - 47.0 % Final    MCV 97 78 - 100 fl Final    MCH 31.7 26.5 - 33.0 pg Final    MCHC 32.8 31.5 - 36.5 g/dL Final    RDW 15.1 (H) 10.0 - 15.0 % Final    Platelet Count 180 150 - 450 10e9/L Final    Diff Method Automated Method  Final    % Neutrophils 63.1 % Final    % Lymphocytes 26.8 % Final    % Monocytes 8.4 % Final    % Eosinophils 1.3 % Final    % Basophils 0.2 % Final    % Immature Granulocytes 0.2 % Final    Nucleated " RBCs 0 0 /100 Final    Absolute Neutrophil 3.9 1.6 - 8.3 10e9/L Final    Absolute Lymphocytes 1.7 0.8 - 5.3 10e9/L Final    Absolute Monocytes 0.5 0.0 - 1.3 10e9/L Final    Absolute Eosinophils 0.1 0.0 - 0.7 10e9/L Final    Absolute Basophils 0.0 0.0 - 0.2 10e9/L Final    Abs Immature Granulocytes 0.0 0 - 0.4 10e9/L Final    Absolute Nucleated RBC 0.0  Final         5/8/2018  5:03 PM      Component Results     Component Value Ref Range & Units Status    Sodium 140 133 - 144 mmol/L Final    Potassium 4.7 3.4 - 5.3 mmol/L Final    Chloride 109 94 - 109 mmol/L Final    Carbon Dioxide 27 20 - 32 mmol/L Final    Anion Gap 4 3 - 14 mmol/L Final    Glucose 86 70 - 99 mg/dL Final    Urea Nitrogen 19 7 - 30 mg/dL Final    Creatinine 0.78 0.52 - 1.04 mg/dL Final    GFR Estimate 68 >60 mL/min/1.7m2 Final    Non  GFR Calc    GFR Estimate If Black 83 >60 mL/min/1.7m2 Final    African American GFR Calc    Calcium 8.8 8.5 - 10.1 mg/dL Final         5/8/2018  5:07 PM      Component Results     Component Value Ref Range & Units Status    Troponin I ES <0.015 0.000 - 0.045 ug/L Final    The 99th percentile for upper reference range is 0.045 ug/L.  Troponin values   in the range of 0.045 - 0.120 ug/L may be associated with risks of adverse   clinical events.           5/8/2018  4:48 PM      Narrative     XR CHEST 2 VW   5/8/2018 4:46 PM     HISTORY: chest pain;     COMPARISON: None.    FINDINGS: The heart is negative.  Left diaphragm is elevated. Mild  fibrotic changes are seen in the lungs.. The pulmonary vasculature is  normal.  Thoracic compression fractures of uncertain age. Fractures of  T10 and T12 are noted..        Impression     IMPRESSION:   1. No active areas of infiltrate.  2. Thoracic compression fractures of indeterminate age.                    Clinical Quality Measure: Blood Pressure Screening     Your blood pressure was checked while you were in the emergency department today. The last reading we  "obtained was  BP: 151/82 . Please read the guidelines below about what these numbers mean and what you should do about them.  If your systolic blood pressure (the top number) is less than 120 and your diastolic blood pressure (the bottom number) is less than 80, then your blood pressure is normal. There is nothing more that you need to do about it.  If your systolic blood pressure (the top number) is 120-139 or your diastolic blood pressure (the bottom number) is 80-89, your blood pressure may be higher than it should be. You should have your blood pressure rechecked within a year by a primary care provider.  If your systolic blood pressure (the top number) is 140 or greater or your diastolic blood pressure (the bottom number) is 90 or greater, you may have high blood pressure. High blood pressure is treatable, but if left untreated over time it can put you at risk for heart attack, stroke, or kidney failure. You should have your blood pressure rechecked by a primary care provider within the next 4 weeks.  If your provider in the emergency department today gave you specific instructions to follow-up with your doctor or provider even sooner than that, you should follow that instruction and not wait for up to 4 weeks for your follow-up visit.        Thank you for choosing [facility]       Thank you for choosing [facility] for your care. Our goal is always to provide you with excellent care. Hearing back from our patients is one way we can continue to improve our services. Please take a few minutes to complete the written survey that you may receive in the mail after you visit with us. Thank you!        [facility] Information     [employer] lets you send messages to your doctor, view your test results, renew your prescriptions, schedule appointments and more. To sign up, go to [email or web address] . Click on \"Log in\" on the left side of the screen, which will take you to the Welcome page. Then click on \"Sign up Now\" on the " right side of the page.     You will be asked to enter the access code listed below, as well as some personal information. Please follow the directions to create your username and password.     Your access code is: [email or web address]  Expires: 2018  5:18 PM     Your access code will  in 90 days. If you need help or a new code, please call your [facility] clinic or [phone].        Care EveryWhere ID     This is your Care EveryWhere ID. This could be used by other organizations to access your [facility] medical records  [id number]        Equal Access to Services     [facility]: Avni Cisneros, atif fu, dominic saldivar, anna fisher . So [facility] [phone].    ATENCIÓN: Si habla español, tiene a borjas disposición servicios gratuitos de asistencia lingüística. Llame al [phone].    We comply with applicable federal civil rights laws and [place] laws. We do not discriminate on the basis of race, color, national origin, age, disability, sex, sexual orientation, or gender identity.            After Visit Summary       This is your record. Keep this with you and show to your community pharmacist(s) and doctor(s) at your next visit.

## 2022-03-20 ENCOUNTER — APPOINTMENT (OUTPATIENT)
Dept: GENERAL RADIOLOGY | Facility: CLINIC | Age: 87
End: 2022-03-20
Attending: EMERGENCY MEDICINE
Payer: MEDICARE

## 2022-03-20 ENCOUNTER — HOSPITAL ENCOUNTER (EMERGENCY)
Facility: CLINIC | Age: 87
Discharge: HOME | End: 2022-03-20
Attending: EMERGENCY MEDICINE | Admitting: EMERGENCY MEDICINE
Payer: MEDICARE

## 2022-03-20 VITALS
HEIGHT: 57 IN | BODY MASS INDEX: 23.51 KG/M2 | DIASTOLIC BLOOD PRESSURE: 89 MMHG | RESPIRATION RATE: 16 BRPM | HEART RATE: 105 BPM | WEIGHT: 109 LBS | SYSTOLIC BLOOD PRESSURE: 184 MMHG | OXYGEN SATURATION: 97 % | TEMPERATURE: 98.9 F

## 2022-03-20 DIAGNOSIS — S63.502A SPRAIN OF LEFT WRIST, INITIAL ENCOUNTER: ICD-10-CM

## 2022-03-20 PROCEDURE — 99283 EMERGENCY DEPT VISIT LOW MDM: CPT

## 2022-03-20 PROCEDURE — 250N000013 HC RX MED GY IP 250 OP 250 PS 637: Performed by: EMERGENCY MEDICINE

## 2022-03-20 PROCEDURE — 73110 X-RAY EXAM OF WRIST: CPT | Mod: LT

## 2022-03-20 RX ORDER — ACETAMINOPHEN 325 MG/1
650 TABLET ORAL ONCE
Status: COMPLETED | OUTPATIENT
Start: 2022-03-20 | End: 2022-03-20

## 2022-03-20 RX ADMIN — ACETAMINOPHEN 650 MG: 325 TABLET, FILM COATED ORAL at 17:31

## 2022-03-20 NOTE — ED TRIAGE NOTES
Pt was walking back to bed from bathroom last night and tripped and fell. Hurting L wrist. Pain is getting worse.

## 2022-03-21 NOTE — ED PROVIDER NOTES
Visit Date: 03/20/2022    CHIEF COMPLAINT:  Left wrist pain.    HISTORY OF PRESENT ILLNESS:  This is a 89-year-old woman who lost her balance last night and fell down.  The only thing that she hurt is her left wrist.  She states the pain has become more severe during the course of the day.  She denies hitting her head, her neck, hurting her chest, her abdomen or other extremities.  She has not taken anything for the pain or put any cold on it.    PAST MEDICAL HISTORY:  Includes gastroesophageal reflux disease, congestive heart failure, spinal stenosis, hypertension, previous stroke.    MEDICATIONS:    1.  Lasix.  2.  Aspirin.  3.  Tenormin.    4.  Lisinopril.    5.  Omeprazole.    6.  Oxybutynin.    ALLERGIES:  PENICILLIN.    FAMILY AND SOCIAL HISTORY:  , lives with her , who was present.    REVIEW OF SYSTEMS:  As noted.  All other systems negative.    PHYSICAL EXAMINATION:    GENERAL:  Reveals a frail, elderly appearing female, sitting, no respiratory distress.  VITAL SIGNS:  Blood pressure 184/89, temperature 98.9, pulse 105, respirations 16, pulse ox 97% on room air.  MUSCULOSKELETAL:  Left arm there is no tenderness of the shoulder, the clavicle, the upper arm, the elbow or proximal forearm.  There is some mild diffuse tenderness of the dorsum of the wrist with some mild tenderness of the anatomical snuffbox.  There is no deformity.  The patient can dorsi and plantarflex, but with discomfort.  Radial pulses 2+ and she has normal sensation and capillary refill in her fingers.  NEUROLOGIC:  She is awake, alert and oriented appropriately.  GCS is 15.      X-ray of the left wrist was obtained.  This shows significant degenerative changes, no acute fractures noted.    The patient received 2 Tylenol for pain.  I have discussed the patient's care with her, that there is no acute fracture.  This seems more consistent with a wrist sprain; however, given there is snuffbox tenderness there could be an occult  scaphoid fracture.  I have asked nursing to place her in a thumb spica splint.  This is accomplished by using both a regular wrist splint and a separate thumb splint to immobilize both.  I have asked the patient to use cold, elevate and use Tylenol or ibuprofen and to make a followup with her primary in 1 week so repeat x-ray can be obtained.    IMPRESSION:  Left wrist sprain.    PLAN:  As noted above.    Daniel Mathur MD        D: 2022   T: 2022   MT: MIMI    Name:     BIMAL STONE  MRN:      [id number]        Account:    [id number]   :      1924           Visit Date: 2022     Document: [id number]

## 2022-04-21 ENCOUNTER — DOCUMENTATION ONLY (OUTPATIENT)
Dept: EMERGENCY MEDICINE | Facility: CLINIC | Age: 87
End: 2022-04-21
Payer: MEDICARE

## 2022-04-21 DIAGNOSIS — Z53.9 ERRONEOUS ENCOUNTER--DISREGARD: ICD-10-CM

## 2022-04-21 DIAGNOSIS — S63.502A SPRAIN OF LEFT WRIST, INITIAL ENCOUNTER: Primary | ICD-10-CM

## 2022-05-09 ENCOUNTER — DOCUMENTATION ONLY (OUTPATIENT)
Dept: EMERGENCY MEDICINE | Facility: CLINIC | Age: 87
End: 2022-05-09
Payer: MEDICARE

## 2022-05-09 DIAGNOSIS — S63.502A SPRAIN OF LEFT WRIST, INITIAL ENCOUNTER: Primary | ICD-10-CM

## 2023-01-01 ENCOUNTER — APPOINTMENT (OUTPATIENT)
Dept: GENERAL RADIOLOGY | Facility: CLINIC | Age: 88
End: 2023-01-01
Attending: EMERGENCY MEDICINE
Payer: MEDICARE

## 2023-01-01 ENCOUNTER — APPOINTMENT (OUTPATIENT)
Dept: CT IMAGING | Facility: CLINIC | Age: 88
End: 2023-01-01
Attending: EMERGENCY MEDICINE
Payer: MEDICARE

## 2023-01-01 ENCOUNTER — APPOINTMENT (OUTPATIENT)
Dept: GENERAL RADIOLOGY | Facility: CLINIC | Age: 88
End: 2023-01-01
Attending: STUDENT IN AN ORGANIZED HEALTH CARE EDUCATION/TRAINING PROGRAM
Payer: MEDICARE

## 2023-01-01 ENCOUNTER — APPOINTMENT (OUTPATIENT)
Dept: PHYSICAL THERAPY | Facility: CLINIC | Age: 88
End: 2023-01-01
Attending: INTERNAL MEDICINE
Payer: MEDICARE

## 2023-01-01 ENCOUNTER — APPOINTMENT (OUTPATIENT)
Dept: PHYSICAL THERAPY | Facility: CLINIC | Age: 88
End: 2023-01-01
Payer: MEDICARE

## 2023-01-01 ENCOUNTER — HOSPITAL ENCOUNTER (EMERGENCY)
Facility: CLINIC | Age: 88
Discharge: HOME | End: 2023-07-02
Attending: EMERGENCY MEDICINE | Admitting: EMERGENCY MEDICINE
Payer: MEDICARE

## 2023-01-01 ENCOUNTER — LAB REQUISITION (OUTPATIENT)
Dept: LAB | Facility: CLINIC | Age: 88
End: 2023-01-01
Payer: MEDICARE

## 2023-01-01 ENCOUNTER — HOSPITAL ENCOUNTER (OUTPATIENT)
Facility: CLINIC | Age: 88
Setting detail: OBSERVATION
Discharge: NURSING, REHAB OR LONG-TERM CARE | End: 2023-05-17
Attending: EMERGENCY MEDICINE | Admitting: INTERNAL MEDICINE
Payer: MEDICARE

## 2023-01-01 VITALS
TEMPERATURE: 97.2 F | HEART RATE: 85 BPM | SYSTOLIC BLOOD PRESSURE: 134 MMHG | HEIGHT: 58 IN | RESPIRATION RATE: 16 BRPM | DIASTOLIC BLOOD PRESSURE: 79 MMHG | WEIGHT: 97 LBS | BODY MASS INDEX: 20.36 KG/M2 | OXYGEN SATURATION: 95 %

## 2023-01-01 VITALS
HEART RATE: 84 BPM | RESPIRATION RATE: 16 BRPM | OXYGEN SATURATION: 95 % | SYSTOLIC BLOOD PRESSURE: 152 MMHG | DIASTOLIC BLOOD PRESSURE: 80 MMHG | TEMPERATURE: 98.2 F

## 2023-01-01 DIAGNOSIS — E07.9 DISORDER OF THYROID, UNSPECIFIED: ICD-10-CM
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
## 2023-01-01 DIAGNOSIS — S22.32XA CLOSED FRACTURE OF ONE RIB OF LEFT SIDE, INITIAL ENCOUNTER: ICD-10-CM

## 2023-01-01 DIAGNOSIS — S22.080A COMPRESSION FRACTURE OF T12 VERTEBRA, INITIAL ENCOUNTER (H): ICD-10-CM

## 2023-01-01 DIAGNOSIS — E53.8 DEFICIENCY OF OTHER SPECIFIED B GROUP VITAMINS: ICD-10-CM

## 2023-01-01 DIAGNOSIS — I10 ESSENTIAL (PRIMARY) HYPERTENSION: ICD-10-CM

## 2023-01-01 DIAGNOSIS — E11.9 TYPE 2 DIABETES MELLITUS WITHOUT COMPLICATIONS (H): ICD-10-CM

## 2023-01-01 DIAGNOSIS — E55.9 VITAMIN D DEFICIENCY, UNSPECIFIED: ICD-10-CM

## 2023-01-01 DIAGNOSIS — S42.022A CLOSED DISPLACED FRACTURE OF SHAFT OF LEFT CLAVICLE, INITIAL ENCOUNTER: ICD-10-CM

## 2023-01-01 LAB
ALBUMIN SERPL BCG-MCNC: 3.5 G/DL (ref 3.5–5.2)
ALP SERPL-CCNC: 85 U/L (ref 35–104)
ALT SERPL W P-5'-P-CCNC: 5 U/L (ref 0–50)
ANION GAP SERPL CALCULATED.3IONS-SCNC: 10 MMOL/L (ref 7–15)
ANION GAP SERPL CALCULATED.3IONS-SCNC: 10 MMOL/L (ref 7–15)
ANION GAP SERPL CALCULATED.3IONS-SCNC: 12 MMOL/L (ref 7–15)
AST SERPL W P-5'-P-CCNC: 25 U/L (ref 0–45)
ATRIAL RATE - MUSE: 102 BPM
BASOPHILS # BLD AUTO: 0 10E3/UL (ref 0–0.2)
BASOPHILS # BLD AUTO: 0 10E3/UL (ref 0–0.2)
BASOPHILS NFR BLD AUTO: 0 %
BASOPHILS NFR BLD AUTO: 0 %
BILIRUB SERPL-MCNC: 0.4 MG/DL
BUN SERPL-MCNC: 13.2 MG/DL (ref 8–23)
BUN SERPL-MCNC: 19.2 MG/DL (ref 8–23)
BUN SERPL-MCNC: 19.3 MG/DL (ref 8–23)
CALCIUM SERPL-MCNC: 8.5 MG/DL (ref 8.2–9.6)
CALCIUM SERPL-MCNC: 8.7 MG/DL (ref 8.2–9.6)
CALCIUM SERPL-MCNC: 8.9 MG/DL (ref 8.2–9.6)
CHLORIDE SERPL-SCNC: 102 MMOL/L (ref 98–107)
CHLORIDE SERPL-SCNC: 103 MMOL/L (ref 98–107)
CHLORIDE SERPL-SCNC: 107 MMOL/L (ref 98–107)
CREAT SERPL-MCNC: 0.63 MG/DL (ref 0.51–0.95)
CREAT SERPL-MCNC: 0.7 MG/DL (ref 0.51–0.95)
CREAT SERPL-MCNC: 0.74 MG/DL (ref 0.51–0.95)
DEPRECATED CALCIDIOL+CALCIFEROL SERPL-MC: 10 UG/L (ref 20–75)
DEPRECATED HCO3 PLAS-SCNC: 23 MMOL/L (ref 22–29)
DEPRECATED HCO3 PLAS-SCNC: 24 MMOL/L (ref 22–29)
DEPRECATED HCO3 PLAS-SCNC: 26 MMOL/L (ref 22–29)
DIASTOLIC BLOOD PRESSURE - MUSE: NORMAL MMHG
EOSINOPHIL # BLD AUTO: 0 10E3/UL (ref 0–0.7)
EOSINOPHIL # BLD AUTO: 0 10E3/UL (ref 0–0.7)
EOSINOPHIL NFR BLD AUTO: 0 %
EOSINOPHIL NFR BLD AUTO: 0 %
ERYTHROCYTE [DISTWIDTH] IN BLOOD BY AUTOMATED COUNT: 14.2 % (ref 10–15)
ERYTHROCYTE [DISTWIDTH] IN BLOOD BY AUTOMATED COUNT: 14.3 % (ref 10–15)
ERYTHROCYTE [DISTWIDTH] IN BLOOD BY AUTOMATED COUNT: 15.1 % (ref 10–15)
FOLATE SERPL-MCNC: 15.3 NG/ML (ref 4.6–34.8)
GFR SERPL CREATININE-BSD FRML MDRD: 72 ML/MIN/1.73M2
GFR SERPL CREATININE-BSD FRML MDRD: 77 ML/MIN/1.73M2
GFR SERPL CREATININE-BSD FRML MDRD: 79 ML/MIN/1.73M2
GLUCOSE SERPL-MCNC: 116 MG/DL (ref 70–99)
GLUCOSE SERPL-MCNC: 131 MG/DL (ref 70–99)
GLUCOSE SERPL-MCNC: 98 MG/DL (ref 70–99)
HBA1C MFR BLD: 5.8 %
HCT VFR BLD AUTO: 36.5 % (ref 35–47)
HCT VFR BLD AUTO: 38.3 % (ref 35–47)
HCT VFR BLD AUTO: 45.5 % (ref 35–47)
HGB BLD-MCNC: 11.7 G/DL (ref 11.7–15.7)
HGB BLD-MCNC: 12 G/DL (ref 11.7–15.7)
HGB BLD-MCNC: 14.8 G/DL (ref 11.7–15.7)
IMM GRANULOCYTES # BLD: 0 10E3/UL
IMM GRANULOCYTES # BLD: 0 10E3/UL
IMM GRANULOCYTES NFR BLD: 0 %
IMM GRANULOCYTES NFR BLD: 0 %
INTERPRETATION ECG - MUSE: NORMAL
LYMPHOCYTES # BLD AUTO: 1.4 10E3/UL (ref 0.8–5.3)
LYMPHOCYTES # BLD AUTO: 1.5 10E3/UL (ref 0.8–5.3)
LYMPHOCYTES NFR BLD AUTO: 14 %
LYMPHOCYTES NFR BLD AUTO: 18 %
MCH RBC QN AUTO: 31.7 PG (ref 26.5–33)
MCH RBC QN AUTO: 32.4 PG (ref 26.5–33)
MCH RBC QN AUTO: 32.9 PG (ref 26.5–33)
MCHC RBC AUTO-ENTMCNC: 31.3 G/DL (ref 31.5–36.5)
MCHC RBC AUTO-ENTMCNC: 32.1 G/DL (ref 31.5–36.5)
MCHC RBC AUTO-ENTMCNC: 32.5 G/DL (ref 31.5–36.5)
MCV RBC AUTO: 101 FL (ref 78–100)
MCV RBC AUTO: 104 FL (ref 78–100)
MCV RBC AUTO: 99 FL (ref 78–100)
MONOCYTES # BLD AUTO: 0.7 10E3/UL (ref 0–1.3)
MONOCYTES # BLD AUTO: 1.1 10E3/UL (ref 0–1.3)
MONOCYTES NFR BLD AUTO: 13 %
MONOCYTES NFR BLD AUTO: 7 %
NEUTROPHILS # BLD AUTO: 5.9 10E3/UL (ref 1.6–8.3)
NEUTROPHILS # BLD AUTO: 7.5 10E3/UL (ref 1.6–8.3)
NEUTROPHILS NFR BLD AUTO: 69 %
NEUTROPHILS NFR BLD AUTO: 79 %
NRBC # BLD AUTO: 0 10E3/UL
NRBC # BLD AUTO: 0 10E3/UL
NRBC BLD AUTO-RTO: 0 /100
NRBC BLD AUTO-RTO: 0 /100
NT-PROBNP SERPL-MCNC: 1084 PG/ML (ref 0–1800)
P AXIS - MUSE: 52 DEGREES
PLATELET # BLD AUTO: 156 10E3/UL (ref 150–450)
PLATELET # BLD AUTO: 185 10E3/UL (ref 150–450)
PLATELET # BLD AUTO: 309 10E3/UL (ref 150–450)
POTASSIUM SERPL-SCNC: 4 MMOL/L (ref 3.4–5.3)
POTASSIUM SERPL-SCNC: 4.1 MMOL/L (ref 3.4–5.3)
POTASSIUM SERPL-SCNC: 5 MMOL/L (ref 3.4–5.3)
PR INTERVAL - MUSE: 140 MS
PROT SERPL-MCNC: 7 G/DL (ref 6.4–8.3)
QRS DURATION - MUSE: 74 MS
QT - MUSE: 348 MS
QTC - MUSE: 453 MS
R AXIS - MUSE: -37 DEGREES
RBC # BLD AUTO: 3.69 10E6/UL (ref 3.8–5.2)
RBC # BLD AUTO: 3.7 10E6/UL (ref 3.8–5.2)
RBC # BLD AUTO: 4.5 10E6/UL (ref 3.8–5.2)
SODIUM SERPL-SCNC: 138 MMOL/L (ref 136–145)
SODIUM SERPL-SCNC: 139 MMOL/L (ref 136–145)
SODIUM SERPL-SCNC: 140 MMOL/L (ref 136–145)
SYSTOLIC BLOOD PRESSURE - MUSE: NORMAL MMHG
T AXIS - MUSE: 63 DEGREES
TROPONIN T SERPL HS-MCNC: 23 NG/L
TSH SERPL DL<=0.005 MIU/L-ACNC: 1.1 UIU/ML (ref 0.3–4.2)
VENTRICULAR RATE- MUSE: 102 BPM
VIT B12 SERPL-MCNC: 496 PG/ML (ref 232–1245)
WBC # BLD AUTO: 8.6 10E3/UL (ref 4–11)
WBC # BLD AUTO: 8.6 10E3/UL (ref 4–11)
WBC # BLD AUTO: 9.7 10E3/UL (ref 4–11)

## 2023-01-01 PROCEDURE — 97161 PT EVAL LOW COMPLEX 20 MIN: CPT | Mod: GP | Performed by: PHYSICAL THERAPIST

## 2023-01-01 PROCEDURE — 84484 ASSAY OF TROPONIN QUANT: CPT | Performed by: EMERGENCY MEDICINE

## 2023-01-01 PROCEDURE — 82746 ASSAY OF FOLIC ACID SERUM: CPT | Mod: ORL | Performed by: FAMILY MEDICINE

## 2023-01-01 PROCEDURE — G0378 HOSPITAL OBSERVATION PER HR: HCPCS

## 2023-01-01 PROCEDURE — 85025 COMPLETE CBC W/AUTO DIFF WBC: CPT | Performed by: EMERGENCY MEDICINE

## 2023-01-01 PROCEDURE — 99284 EMERGENCY DEPT VISIT MOD MDM: CPT

## 2023-01-01 PROCEDURE — 73030 X-RAY EXAM OF SHOULDER: CPT | Mod: LT

## 2023-01-01 PROCEDURE — 250N000013 HC RX MED GY IP 250 OP 250 PS 637: Performed by: INTERNAL MEDICINE

## 2023-01-01 PROCEDURE — 97530 THERAPEUTIC ACTIVITIES: CPT | Mod: GP

## 2023-01-01 PROCEDURE — 80053 COMPREHEN METABOLIC PANEL: CPT | Mod: ORL | Performed by: FAMILY MEDICINE

## 2023-01-01 PROCEDURE — 72100 X-RAY EXAM L-S SPINE 2/3 VWS: CPT

## 2023-01-01 PROCEDURE — G1010 CDSM STANSON: HCPCS

## 2023-01-01 PROCEDURE — 250N000011 HC RX IP 250 OP 636: Performed by: EMERGENCY MEDICINE

## 2023-01-01 PROCEDURE — 85025 COMPLETE CBC W/AUTO DIFF WBC: CPT | Performed by: INTERNAL MEDICINE

## 2023-01-01 PROCEDURE — 84443 ASSAY THYROID STIM HORMONE: CPT | Mod: ORL | Performed by: FAMILY MEDICINE

## 2023-01-01 PROCEDURE — P9603 ONE-WAY ALLOW PRORATED MILES: HCPCS | Mod: ORL | Performed by: FAMILY MEDICINE

## 2023-01-01 PROCEDURE — 85027 COMPLETE CBC AUTOMATED: CPT | Mod: ORL | Performed by: FAMILY MEDICINE

## 2023-01-01 PROCEDURE — 93005 ELECTROCARDIOGRAM TRACING: CPT

## 2023-01-01 PROCEDURE — 73502 X-RAY EXAM HIP UNI 2-3 VIEWS: CPT

## 2023-01-01 PROCEDURE — 99285 EMERGENCY DEPT VISIT HI MDM: CPT | Mod: 25

## 2023-01-01 PROCEDURE — 97530 THERAPEUTIC ACTIVITIES: CPT | Mod: GP | Performed by: PHYSICAL THERAPIST

## 2023-01-01 PROCEDURE — 73000 X-RAY EXAM OF COLLAR BONE: CPT | Mod: LT

## 2023-01-01 PROCEDURE — 36415 COLL VENOUS BLD VENIPUNCTURE: CPT | Mod: ORL | Performed by: FAMILY MEDICINE

## 2023-01-01 PROCEDURE — 83036 HEMOGLOBIN GLYCOSYLATED A1C: CPT | Mod: ORL | Performed by: FAMILY MEDICINE

## 2023-01-01 PROCEDURE — 36415 COLL VENOUS BLD VENIPUNCTURE: CPT | Performed by: INTERNAL MEDICINE

## 2023-01-01 PROCEDURE — 99239 HOSP IP/OBS DSCHRG MGMT >30: CPT | Performed by: INTERNAL MEDICINE

## 2023-01-01 PROCEDURE — 73070 X-RAY EXAM OF ELBOW: CPT | Mod: LT

## 2023-01-01 PROCEDURE — 82607 VITAMIN B-12: CPT | Mod: ORL | Performed by: FAMILY MEDICINE

## 2023-01-01 PROCEDURE — 82306 VITAMIN D 25 HYDROXY: CPT | Mod: ORL | Performed by: FAMILY MEDICINE

## 2023-01-01 PROCEDURE — 96374 THER/PROPH/DIAG INJ IV PUSH: CPT

## 2023-01-01 PROCEDURE — 36415 COLL VENOUS BLD VENIPUNCTURE: CPT | Performed by: EMERGENCY MEDICINE

## 2023-01-01 PROCEDURE — 99231 SBSQ HOSP IP/OBS SF/LOW 25: CPT | Performed by: HOSPITALIST

## 2023-01-01 PROCEDURE — 80048 BASIC METABOLIC PNL TOTAL CA: CPT | Performed by: EMERGENCY MEDICINE

## 2023-01-01 PROCEDURE — 99223 1ST HOSP IP/OBS HIGH 75: CPT | Mod: A1 | Performed by: INTERNAL MEDICINE

## 2023-01-01 PROCEDURE — 96376 TX/PRO/DX INJ SAME DRUG ADON: CPT

## 2023-01-01 PROCEDURE — 71045 X-RAY EXAM CHEST 1 VIEW: CPT

## 2023-01-01 PROCEDURE — 99232 SBSQ HOSP IP/OBS MODERATE 35: CPT | Performed by: INTERNAL MEDICINE

## 2023-01-01 PROCEDURE — 99232 SBSQ HOSP IP/OBS MODERATE 35: CPT | Performed by: PHYSICIAN ASSISTANT

## 2023-01-01 PROCEDURE — 83880 ASSAY OF NATRIURETIC PEPTIDE: CPT | Performed by: EMERGENCY MEDICINE

## 2023-01-01 PROCEDURE — 71101 X-RAY EXAM UNILAT RIBS/CHEST: CPT | Mod: LT

## 2023-01-01 PROCEDURE — 80048 BASIC METABOLIC PNL TOTAL CA: CPT | Performed by: INTERNAL MEDICINE

## 2023-01-01 RX ORDER — AMOXICILLIN 250 MG
2 CAPSULE ORAL 2 TIMES DAILY PRN
Status: DISCONTINUED | OUTPATIENT
Start: 2023-01-01 | End: 2023-01-01 | Stop reason: HOSPADM

## 2023-01-01 RX ORDER — ONDANSETRON 2 MG/ML
4 INJECTION INTRAMUSCULAR; INTRAVENOUS EVERY 6 HOURS PRN
Status: DISCONTINUED | OUTPATIENT
Start: 2023-01-01 | End: 2023-01-01 | Stop reason: HOSPADM

## 2023-01-01 RX ORDER — PROCHLORPERAZINE 25 MG
12.5 SUPPOSITORY, RECTAL RECTAL EVERY 12 HOURS PRN
Status: DISCONTINUED | OUTPATIENT
Start: 2023-01-01 | End: 2023-01-01 | Stop reason: HOSPADM

## 2023-01-01 RX ORDER — LIDOCAINE 4 G/G
1 PATCH TOPICAL EVERY 24 HOURS
Qty: 10 PATCH | Refills: 0 | DISCHARGE
Start: 2023-01-01 | End: 2023-01-01

## 2023-01-01 RX ORDER — AMOXICILLIN 250 MG
1 CAPSULE ORAL 2 TIMES DAILY PRN
Status: DISCONTINUED | OUTPATIENT
Start: 2023-01-01 | End: 2023-01-01 | Stop reason: HOSPADM

## 2023-01-01 RX ORDER — FENTANYL CITRATE 50 UG/ML
50 INJECTION, SOLUTION INTRAMUSCULAR; INTRAVENOUS ONCE
Status: COMPLETED | OUTPATIENT
Start: 2023-01-01 | End: 2023-01-01

## 2023-01-01 RX ORDER — PROCHLORPERAZINE MALEATE 5 MG
5 TABLET ORAL EVERY 6 HOURS PRN
Status: DISCONTINUED | OUTPATIENT
Start: 2023-01-01 | End: 2023-01-01 | Stop reason: HOSPADM

## 2023-01-01 RX ORDER — ACETAMINOPHEN 500 MG
1000 TABLET ORAL 3 TIMES DAILY
DISCHARGE
Start: 2023-01-01

## 2023-01-01 RX ORDER — PANTOPRAZOLE SODIUM 40 MG/1
40 TABLET, DELAYED RELEASE ORAL DAILY PRN
Status: DISCONTINUED | OUTPATIENT
Start: 2023-01-01 | End: 2023-01-01 | Stop reason: HOSPADM

## 2023-01-01 RX ORDER — NALOXONE HYDROCHLORIDE 0.4 MG/ML
0.4 INJECTION, SOLUTION INTRAMUSCULAR; INTRAVENOUS; SUBCUTANEOUS
Status: DISCONTINUED | OUTPATIENT
Start: 2023-01-01 | End: 2023-01-01 | Stop reason: HOSPADM

## 2023-01-01 RX ORDER — NALOXONE HYDROCHLORIDE 0.4 MG/ML
0.2 INJECTION, SOLUTION INTRAMUSCULAR; INTRAVENOUS; SUBCUTANEOUS
Status: DISCONTINUED | OUTPATIENT
Start: 2023-01-01 | End: 2023-01-01 | Stop reason: HOSPADM

## 2023-01-01 RX ORDER — POLYETHYLENE GLYCOL 3350 17 G/17G
17 POWDER, FOR SOLUTION ORAL DAILY PRN
Status: DISCONTINUED | OUTPATIENT
Start: 2023-01-01 | End: 2023-01-01 | Stop reason: HOSPADM

## 2023-01-01 RX ORDER — ONDANSETRON 4 MG/1
4 TABLET, ORALLY DISINTEGRATING ORAL EVERY 6 HOURS PRN
Status: DISCONTINUED | OUTPATIENT
Start: 2023-01-01 | End: 2023-01-01 | Stop reason: HOSPADM

## 2023-01-01 RX ORDER — HYDROCODONE BITARTRATE AND ACETAMINOPHEN 5; 325 MG/1; MG/1
1 TABLET ORAL ONCE
Status: DISCONTINUED | OUTPATIENT
Start: 2023-01-01 | End: 2023-01-01

## 2023-01-01 RX ORDER — LIDOCAINE 4 G/G
1 PATCH TOPICAL
Status: DISCONTINUED | OUTPATIENT
Start: 2023-01-01 | End: 2023-01-01 | Stop reason: HOSPADM

## 2023-01-01 RX ORDER — TRAMADOL HYDROCHLORIDE 50 MG/1
25 TABLET ORAL EVERY 6 HOURS PRN
Qty: 10 TABLET | Refills: 0 | Status: SHIPPED | OUTPATIENT
Start: 2023-01-01

## 2023-01-01 RX ORDER — ACETAMINOPHEN 325 MG/1
650 TABLET ORAL EVERY 6 HOURS PRN
Status: DISCONTINUED | OUTPATIENT
Start: 2023-01-01 | End: 2023-01-01 | Stop reason: HOSPADM

## 2023-01-01 RX ORDER — ACETAMINOPHEN 500 MG
1000 TABLET ORAL 3 TIMES DAILY
Status: DISCONTINUED | OUTPATIENT
Start: 2023-01-01 | End: 2023-01-01 | Stop reason: HOSPADM

## 2023-01-01 RX ORDER — TRAMADOL HYDROCHLORIDE 50 MG/1
25 TABLET ORAL EVERY 6 HOURS PRN
Qty: 10 TABLET | Refills: 0 | Status: SHIPPED | OUTPATIENT
Start: 2023-01-01 | End: 2023-01-01

## 2023-01-01 RX ORDER — HYDROMORPHONE HYDROCHLORIDE 1 MG/ML
.3-.5 INJECTION, SOLUTION INTRAMUSCULAR; INTRAVENOUS; SUBCUTANEOUS EVERY 4 HOURS PRN
Status: DISCONTINUED | OUTPATIENT
Start: 2023-01-01 | End: 2023-01-01 | Stop reason: HOSPADM

## 2023-01-01 RX ORDER — ACETAMINOPHEN 650 MG/1
650 SUPPOSITORY RECTAL EVERY 6 HOURS PRN
Status: DISCONTINUED | OUTPATIENT
Start: 2023-01-01 | End: 2023-01-01 | Stop reason: HOSPADM

## 2023-01-01 RX ORDER — AMOXICILLIN 250 MG
1 CAPSULE ORAL 2 TIMES DAILY PRN
DISCHARGE
Start: 2023-01-01

## 2023-01-01 RX ADMIN — ACETAMINOPHEN 1000 MG: 500 TABLET ORAL at 19:25

## 2023-01-01 RX ADMIN — ACETAMINOPHEN 1000 MG: 500 TABLET ORAL at 14:26

## 2023-01-01 RX ADMIN — ACETAMINOPHEN 1000 MG: 500 TABLET ORAL at 20:06

## 2023-01-01 RX ADMIN — ACETAMINOPHEN 1000 MG: 500 TABLET ORAL at 09:27

## 2023-01-01 RX ADMIN — ACETAMINOPHEN 1000 MG: 500 TABLET ORAL at 08:50

## 2023-01-01 RX ADMIN — ACETAMINOPHEN 1000 MG: 500 TABLET ORAL at 08:43

## 2023-01-01 RX ADMIN — FENTANYL CITRATE 50 MCG: 50 INJECTION, SOLUTION INTRAMUSCULAR; INTRAVENOUS at 19:11

## 2023-01-01 RX ADMIN — ACETAMINOPHEN 1000 MG: 500 TABLET ORAL at 22:08

## 2023-01-01 RX ADMIN — ACETAMINOPHEN 1000 MG: 500 TABLET ORAL at 13:11

## 2023-01-01 RX ADMIN — POLYETHYLENE GLYCOL 3350 17 G: 17 POWDER, FOR SOLUTION ORAL at 11:20

## 2023-01-01 RX ADMIN — LIDOCAINE 1 PATCH: 560 PATCH PERCUTANEOUS; TOPICAL; TRANSDERMAL at 19:25

## 2023-01-01 RX ADMIN — LIDOCAINE 1 PATCH: 560 PATCH PERCUTANEOUS; TOPICAL; TRANSDERMAL at 22:06

## 2023-01-01 RX ADMIN — LIDOCAINE 1 PATCH: 560 PATCH PERCUTANEOUS; TOPICAL; TRANSDERMAL at 19:50

## 2023-01-01 RX ADMIN — TRAMADOL HYDROCHLORIDE 25 MG: 50 TABLET, COATED ORAL at 16:14

## 2023-01-01 RX ADMIN — ACETAMINOPHEN 1000 MG: 500 TABLET ORAL at 19:50

## 2023-01-01 RX ADMIN — FENTANYL CITRATE 50 MCG: 50 INJECTION, SOLUTION INTRAMUSCULAR; INTRAVENOUS at 16:50

## 2023-01-01 RX ADMIN — TRAMADOL HYDROCHLORIDE 25 MG: 50 TABLET, COATED ORAL at 11:38

## 2023-01-01 RX ADMIN — ACETAMINOPHEN 1000 MG: 500 TABLET ORAL at 13:46

## 2023-01-01 RX ADMIN — ACETAMINOPHEN 1000 MG: 500 TABLET ORAL at 08:30

## 2023-01-01 RX ADMIN — ACETAMINOPHEN 1000 MG: 500 TABLET ORAL at 13:06

## 2023-01-01 RX ADMIN — LIDOCAINE 1 PATCH: 560 PATCH PERCUTANEOUS; TOPICAL; TRANSDERMAL at 21:58

## 2023-01-01 ASSESSMENT — ACTIVITIES OF DAILY LIVING (ADL)
ADLS_ACUITY_SCORE: 54
ADLS_ACUITY_SCORE: 50
ADLS_ACUITY_SCORE: 46
ADLS_ACUITY_SCORE: 45
ADLS_ACUITY_SCORE: 45
DEPENDENT_IADLS:: CLEANING;COOKING;LAUNDRY;SHOPPING;MEAL PREPARATION;TRANSPORTATION;INCONTINENCE
ADLS_ACUITY_SCORE: 46
ADLS_ACUITY_SCORE: 50
ADLS_ACUITY_SCORE: 54
ADLS_ACUITY_SCORE: 49
ADLS_ACUITY_SCORE: 45
ADLS_ACUITY_SCORE: 49
ADLS_ACUITY_SCORE: 35
ADLS_ACUITY_SCORE: 50
ADLS_ACUITY_SCORE: 45
ADLS_ACUITY_SCORE: 49
ADLS_ACUITY_SCORE: 50
ADLS_ACUITY_SCORE: 45
ADLS_ACUITY_SCORE: 49
ADLS_ACUITY_SCORE: 45
ADLS_ACUITY_SCORE: 49
ADLS_ACUITY_SCORE: 46
ADLS_ACUITY_SCORE: 35
ADLS_ACUITY_SCORE: 41
ADLS_ACUITY_SCORE: 50
ADLS_ACUITY_SCORE: 35
ADLS_ACUITY_SCORE: 46
ADLS_ACUITY_SCORE: 45
ADLS_ACUITY_SCORE: 50
ADLS_ACUITY_SCORE: 45
ADLS_ACUITY_SCORE: 50
ADLS_ACUITY_SCORE: 46
ADLS_ACUITY_SCORE: 45
ADLS_ACUITY_SCORE: 45
ADLS_ACUITY_SCORE: 41
ADLS_ACUITY_SCORE: 49
ADLS_ACUITY_SCORE: 46
ADLS_ACUITY_SCORE: 45
ADLS_ACUITY_SCORE: 35
ADLS_ACUITY_SCORE: 45
ADLS_ACUITY_SCORE: 49
ADLS_ACUITY_SCORE: 50
ADLS_ACUITY_SCORE: 50
ADLS_ACUITY_SCORE: 45
ADLS_ACUITY_SCORE: 48

## 2023-01-01 ASSESSMENT — ENCOUNTER SYMPTOMS
ARTHRALGIAS: 1
DIZZINESS: 0
NECK PAIN: 1
HEADACHES: 0

## 2023-05-13 PROBLEM — S42.022A CLOSED DISPLACED FRACTURE OF SHAFT OF LEFT CLAVICLE, INITIAL ENCOUNTER: Status: ACTIVE | Noted: 2023-01-01

## 2023-05-13 NOTE — ED NOTES
Bed: ED02  Expected date:   Expected time:   Means of arrival:   Comments:  [place] 89f fall, shoulder

## 2023-05-13 NOTE — ED PROVIDER NOTES
History     Chief Complaint:  Fall       HPI   Breanna Her is a 89 year old female with a history of hypertension, CHF, stroke, osteoporosis who presents via EMS with left shoulder pain after a fall. She rolled and fell off the couch, where she typically sleeps, around 0500 this morning. She denies preceding symptoms including dizziness. She has had left shoulder pain into her collarbone and also reports of neck pain, left hip pain and left rib pain here. Her daughter notes that she has difficulty ambulating. No pain medications were given prior to arrival. Denies headache.     Independent Historian:   Daughter - They report history, see above.    Review of External Notes: Clinic note reviewed from February 1, 2023 when the patient was seen following a fall.  At that time she was noted to have chronic diastolic heart failure and pedal edema.    ROS:  Review of Systems   Musculoskeletal: Positive for arthralgias and neck pain.   Neurological: Negative for dizziness and headaches.   All other systems reviewed and are negative.      Allergies:  Pcn [Penicillins]     Medications:    Aspirin  Atenolol  Lasix  Norco  Lisinopril  Macrobid  Prilosec  Oxytrol  Elmiron  Psyllium  Requip  Senokot  Tramadol  Trimethoprim    Past Medical History:   CHF  Esophageal reflux  Fecal incontinence  Herpes zoster with other nervous system complications  Osteoarthrosis  Osteoporosis  RLS  Spinal stenosis, lumbar  Stroke  Hypertension  Urinary incontinence  Recurrent UTI  Sensorineural hearing loss of both ears  CKD, stage 3  Depression  Hiatal hernia  Non-rheumatic mitral regurgitation  C. difficile colitis  Atrial fibrillation  Pyelonephritis  SBO    Past Surgical History:    Appendectomy  Cataract IOL  Cholecystectomy  Cystocele repair  Cystoscopy, biopsy bladder  Cystoscopy, retrogrades, bilateral  Colonoscopy   Implant stimulator and leads sacral nerve  Hysterectomy  Laminectomy lumbar one level  Right total knee  "arthroplasty    Family History:    Father - heart disease  Mother - colon cancer, hypertension  Sister - breast cancer    Social History:  The patient presents to the ED via EMS with her daughter.  PCP: Kelly Armas     Physical Exam     Patient Vitals for the past 24 hrs:   BP Temp Temp src Pulse Resp SpO2 Height Weight   05/13/23 1634 (!) 177/93 98.6  F (37  C) Oral 98 16 97 % 1.473 m (4' 10\") 47.6 kg (105 lb)        Physical Exam  Constitutional:       General: She is not in acute distress.     Appearance: Normal appearance. She is not diaphoretic.   HENT:      Head: Atraumatic.      Right Ear: External ear normal.      Left Ear: External ear normal.      Mouth/Throat:      Mouth: Mucous membranes are moist.   Eyes:      General: No scleral icterus.     Conjunctiva/sclera: Conjunctivae normal.   Neck:      Comments: Mild diffuse C-spine tenderness without step-off.  Cardiovascular:      Rate and Rhythm: Normal rate and regular rhythm.      Comments: Systolic murmur  Pulmonary:      Effort: No respiratory distress.      Breath sounds: Normal breath sounds.   Abdominal:      General: Abdomen is flat.      Tenderness: There is no abdominal tenderness.   Musculoskeletal:      Cervical back: Neck supple.      Comments: No thoracic tenderness.  Mild lumbar paraspinal tenderness.  There is ecchymosis with swelling and tenderness over the left clavicle.  Limited range of motion of the shoulder.  Pain over the posterior shoulder elicited with tenderness to palpation.  Mild tenderness of the left elbow.  No tenderness of the wrist.  Mild lateral left hip tenderness.  There is full flexion of the hips bilaterally actively.  No tenderness of the knees or ankles.  Trace edema around the ankles.  Mild left upper chest wall tenderness without crepitance.   Skin:     General: Skin is warm.      Capillary Refill: Capillary refill takes less than 2 seconds.      Findings: No rash.   Neurological:      Mental Status: She is " alert.      Comments: Speech is fluent.  No facial asymmetry.  Strength and sensation grossly intact over the extremities   Psychiatric:         Mood and Affect: Mood normal.         Behavior: Behavior normal.           Emergency Department Course   ECG  ECG results from 05/13/23   EKG 12-lead, tracing only     Value    Systolic Blood Pressure     Diastolic Blood Pressure     Ventricular Rate 102    Atrial Rate 102    AR Interval 140    QRS Duration 74        QTc 453    P Axis 52    R AXIS -37    T Axis 63    Interpretation ECG      Sinus tachycardia  Possible Left atrial enlargement  Left axis deviation  Minimal voltage criteria for LVH, may be normal variant ( R in aVL )  Abnormal ECG  When compared with ECG of 08-MAY-2018 15:53,  No significant change was found  Confirmed by GENERATED REPORT, COMPUTER ([id number]),  Fariha Mascorro ([id number]) on 5/13/2023 5:23:50 PM           Imaging:  CT Head w/o Contrast   Final Result   IMPRESSION:   1.  No CT evidence for acute intracranial process.   2.  Brain atrophy and presumed chronic microvascular ischemic changes as above.      CT Cervical Spine w/o Contrast   Final Result   IMPRESSION:   1.  No evidence of an acute displaced fracture of the cervical spine.      XR Elbow Left 2 Views   Final Result   IMPRESSION: Normal joint spaces and alignment. No fracture or joint effusion. Bone demineralization.      XR Lumbar Spine 2/3 Views   Final Result   IMPRESSION: On the frontal view, image quality is significantly degraded by overlapping ribs, obscuring the upper lumbar spine. Mild rightward scoliosis of the upper lumbar spine and levoscoliosis centered at L4-L5. Mild anterior spondylolisthesis at    L3-L4. Otherwise normal alignment.      Mild to moderate superior endplate compression fracture at T12, age-indeterminate. Otherwise normal vertebral body heights. Severe disc space narrowing at L2-L3 and L3-L4 with probable disc space ankylosis. Moderate to severe disc space  narrowing at    other levels. Moderate facet arthropathy. The visualized sacrum and pelvis are unremarkable. Right-sided ureteral stent. Transsacral stimulator lead.      XR Pelvis and Hip Left 2 Views   Final Result   IMPRESSION:   1.  No fracture or joint malalignment.   2.  Mild left hip degenerative arthrosis.   3.  Bone demineralization.   4.  Advanced degenerative changes in the lower lumbar spine noted.   5.  Right ureteral stent.   6.  Stimulator device projecting over the right pelvis, with lead projecting over the sacrum.      XR Shoulder Left 3 Views   Final Result   IMPRESSION:   1.  Acute comminuted fracture of the left lateral clavicle. This includes a moderate-sized butterfly fragment, which demonstrates rotation and displacement.   2.  Normal glenohumeral alignment.   3.  Bone demineralization.   4.  Coarse interstitial opacities in both lungs.      XR Chest 1 View   Final Result   IMPRESSION: Cardiac enlargement with interval increase in pulmonary vascular congestion. Interstitial opacities in lower lungs compatible with edema. Findings compatible with component of CHF/volume overload. Aortic calcification. Increased opacities in    the lung bases likely relating to atelectasis. No definitive evidence for pneumothorax. Partially visualized oblique fracture middle third left clavicle with the superior apex angulation. Marked degenerative changes right shoulder.         Report per radiology    Laboratory:  Labs Ordered and Resulted from Time of ED Arrival to Time of ED Departure   BASIC METABOLIC PANEL - Abnormal       Result Value    Sodium 138      Potassium 5.0      Chloride 102      Carbon Dioxide (CO2) 26      Anion Gap 10      Urea Nitrogen 19.3      Creatinine 0.63      Calcium 8.9      Glucose 116 (*)     GFR Estimate 79     CBC WITH PLATELETS AND DIFFERENTIAL - Abnormal    WBC Count 9.7      RBC Count 4.50      Hemoglobin 14.8      Hematocrit 45.5       (*)     MCH 32.9      MCHC 32.5       RDW 14.3      Platelet Count 156      % Neutrophils 79      % Lymphocytes 14      % Monocytes 7      % Eosinophils 0      % Basophils 0      % Immature Granulocytes 0      NRBCs per 100 WBC 0      Absolute Neutrophils 7.5      Absolute Lymphocytes 1.4      Absolute Monocytes 0.7      Absolute Eosinophils 0.0      Absolute Basophils 0.0      Absolute Immature Granulocytes 0.0      Absolute NRBCs 0.0     TROPONIN T, HIGH SENSITIVITY - Abnormal    Troponin T, High Sensitivity 23 (*)    NT PROBNP INPATIENT - Normal    N terminal Pro BNP Inpatient 1,084          Emergency Department Course & Assessments:           Interventions:  Medications   fentaNYL (PF) (SUBLIMAZE) injection 50 mcg (50 mcg Intravenous $Given 5/13/23 1650)   fentaNYL (PF) (SUBLIMAZE) injection 50 mcg (50 mcg Intravenous $Given 5/13/23 1911)        Assessments:  1621  I obtained history and examined the patient as noted above. C collar was placed.  1843 I rechecked the patient and explained findings.    Independent Interpretation (X-rays, CTs, rhythm strip):  Left shoulder x-ray independently reviewed.  There is an acute, comminuted fracture    Consultations/Discussion of Management or Tests:  1840 I spoke with Dr. Cleveland, orthopedics, regarding the patient.  1859 I spoke with Dr. Myrick, hospitalist, who accepts the patient.    Social Determinants of Health affecting care:   The patient lives with her son who is not able to adequately care for her and help her with her daily activities.  She was not able to walk today due to the pain    Disposition:  The patient was admitted to the hospital under the care of Dr. Myrick.     Impression & Plan      Medical Decision Making:  This patient is a 89-year-old who presents to the emergency department for evaluation of a fall.  She has an acute, comminuted left clavicle fracture.  This was reviewed with orthopedics.  Emergent surgical invention is not indicated.  The patient was placed in a sling.    The  patient has difficulty getting around and is high risk for repeat fall.  She will be admitted for observation, pain management, physical therapy evaluation, and possible TCU placement.        Diagnosis:    ICD-10-CM    1. Closed displaced fracture of shaft of left clavicle, initial encounter  S42.022A       2. Fall, initial encounter  W19.XXXA       3. Compression fracture of T12 vertebra, initial encounter (H)  S22.080A            Scribe Disclosure:  I, Pilar Ash, am serving as a scribe at 4:20 PM on 5/13/2023 to document services personally performed by Kishan Devlin MD based on my observations and the provider's statements to me.     5/13/2023   Kishan Devlin MD McRoberts, Sean Edward, MD  05/13/23 1916

## 2023-05-13 NOTE — ED TRIAGE NOTES
Patient lives with son and sleeps on day bed. At 0500 patient rolled on to the floor and landed on left shoulder causing pain. When EMS arrived patient was incontinent of urine and c/o left shoulder pain.      Triage Assessment     Row Name 05/13/23 4195       Triage Assessment (Adult)    Airway WDL WDL       Respiratory WDL    Respiratory WDL rhythm/pattern    Rhythm/Pattern, Respiratory no shortness of breath reported       Skin Circulation/Temperature WDL    Skin Circulation/Temperature WDL --  bruising to left shoulder       Cardiac WDL    Cardiac WDL rhythm    Pulse Rate & Regularity radial pulse regular       Peripheral/Neurovascular WDL    Peripheral Neurovascular WDL WDL       Cognitive/Neuro/Behavioral WDL    Cognitive/Neuro/Behavioral WDL WDL

## 2023-05-13 NOTE — PHARMACY-ADMISSION MEDICATION HISTORY
Pharmacist Admission Medication History    Admission medication history is complete. The information provided in this note is only as accurate as the sources available at the time of the update.    Medication reconciliation/reorder completed by provider prior to medication history? No    Information Source(s): Family member via in-person    Pertinent Information:     Changes made to PTA medication list:    Added: None    Deleted: None    Changed: None    Medication Affordability:       Allergies reviewed with patient and updates made in EHR: no    Medication History Completed By: Ting Mathew RPH 5/13/2023 6:56 PM    Prior to Admission medications    Medication Sig Last Dose Taking? Auth Provider Long Term End Date   Omeprazole (PRILOSEC PO) Take 20 mg by mouth daily as needed   Reported, Patient     Ting Mathew PharmD

## 2023-05-13 NOTE — ED NOTES
"[facility]  ED Nurse Handoff Report    ED Chief complaint: Fall      ED Diagnosis:   Final diagnoses:   None       Code Status: not addressed at this time    Allergies:   Allergies   Allergen Reactions    Pcn [Penicillins]        Patient Story: Patient lives with son and sleeps on day bed. At 0500 patient rolled on to the floor and landed on left shoulder causing pain. When EMS arrived patient was incontinent of urine and c/o left shoulder pain.     Focused Assessment:  Patient has deformity to left shoulder and bruising to the area. Patient had dried stool and had a large amount of urinary incontinence. Patient c/o left shoulder pain and hip pain.     Treatments and/or interventions provided: IV, labs, fentanyl, CT scan, xray  Patient's response to treatments and/or interventions: decrease in pain    To be done/followed up on inpatient unit:  continue to monitor    Does this patient have any cognitive concerns?: Forgetful    Activity level - Baseline/Home:  Unknown  Activity Level - Current:   Unknown    Patient's Preferred language: English   Needed?: No    Isolation: None and Contact   Infection: Not Applicable  VRE  Patient tested for COVID 19 prior to admission: NO  Bariatric?: No    Vital Signs:   Vitals:    05/13/23 1634   BP: (!) 177/93   Pulse: 98   Resp: 16   Temp: 98.6  F (37  C)   TempSrc: Oral   SpO2: 97%   Weight: 47.6 kg (105 lb)   Height: 1.473 m (4' 10\")       Cardiac Rhythm:     Was the PSS-3 completed:   Yes  What interventions are required if any?               Family Comments: Daughter and son at bedside  OBS brochure/video discussed/provided to patient/family: N/A              Name of person given brochure if not patient: NA              Relationship to patient: NA    For the majority of the shift this patient's behavior was Green.   Behavioral interventions performed were None.    ED NURSE PHONE NUMBER: [phone]         "

## 2023-05-14 NOTE — PLAN OF CARE
VSS on RA.   Alert, oriented x3. Per pt's daughter, pt's mentation waxes and wanes baseline.   Very tired today rested well; very frail. Follows commands and answers questions appropriately, speech slow.   Mepilex placed low mid back and coccyx-(Blanchable redness intact).  L shoulder swelling and bruising Sling in place and slight pillow to support elbow which relieves pain to clavicle  L Clavicle pain controlled w/ tylenol and lidocaine patch-Removed at 1100   Turn/repo as tolerated. Up X2 Pivot to BSC on her Right side of bed only   PIV SL.    Optifoam applied to neck to minimize friction and discomfort. Cardiac diet.   Pills crushed in applesauce. Incontinent of bladder, no BM today  Plan for OT, SW/CC,  and pt stood at bedside. Did not want to sit in chair today  Ortho saw patient and will do conservative intervention.

## 2023-05-14 NOTE — PROGRESS NOTES
OBS GOALS    -diagnostic tests and consults completed and resulted: NOT MET    -vital signs normal or at patient baseline: MET

## 2023-05-14 NOTE — PROGRESS NOTES
[facility]    Medicine Progress Note - Hospitalist Service    Date of Admission:  5/13/2023    Assessment & Plan   Ms. Breanna Her is a 89-year-old female patient history including aortic stenosis; CHF; spinal stenosis; stroke history; and osteoporosis; who presents after suffering a fall and found to have suffered an acute comminuted left clavicle fracture.     On initial evaluation was afebrile, hypertensive.  ECG showed sinus rhythm without acute ischemic changes.  Labs notable for BMP which was unremarkable; CBC unremarkable; N-terminal proBNP 1084, troponin 23.     Chest x-ray showed interval increase in pulmonary vascular congestion and interstitial opacities in the lower lungs compatible with edema; partially visualized oblique fracture middle third left clavicle with superior apex angulation; no pneumothorax.       Left shoulder x-ray showed acute comminuted fracture of the left lateral clavicle with moderate-sized butterfly fragment that demonstrated rotation and displacement. Pelvis/hip x-ray was negative for acute fracture.  Lumbar spine x-rays showed mild to moderate superior endplate compression fracture at T12 age-indeterminate and other chronic findings.  Cervical spine CT was negative for acute fracture.  Head CT was negative for acute findings.        Fall with acute comminuted left clavicle fracture.  T12 compression fracture, possibly chronic.  * Initial presentation as above.  - Start acetaminophen 1000 mg TID; lidocaine patch 4% daily; PRN acetaminophen; PRN tramadol; PRN IV hydromorphone; minimize opioids as able.  - Orthopedic Surgery consult; at this time, it appears that this will be managed nonoperatively.  - PT recommending TCU  -SW consulted    Troponin elevation, suspect demand ischemia (type 2 NSTEMI).  - Continue to monitor clinically (23), no further CP or symptoms concerning for ACS     Possible pulmonary edema on CXR.  Moderate-severe AS.  CHF  (HFpEF).  * Echo 11/2019 showed LVEF 70-75%, grade 1 diastolic dysfunction; probably bicuspid AV with moderate AS, mild-moderate AR; MS, mild MR; moderate-severe TR; ascending aorta 3.9 cm.   * Initial presentation as above. CXR showed interval increase in pulmonary vascular congestion and interstitial opacities in the lower lungs compatible with edema; partially visualized oblique fracture middle third left clavicle with superior apex angulation; no pneumothorax. Pt not hypoxic, no dyspnea, does not appear volume overloaded or in acute CHF on exam.  - Continue to monitor clinically.  - Does not appear that AS is being actively followed.     GERD with hiatal hernia.  - Continue omeprazole/pantoprazole.     Other chronic medical issues:  Spinal stenosis. H/o sacral nerve stimulator, apparently inactive.  Stroke history.  RLS.  OA.  Osteoporosis.  H/o urinary retention.  - Noted.          Diet: Combination Diet Low Saturated Fat Na <2400mg Diet, No Caffeine Diet    DVT Prophylaxis: Low Risk/Ambulatory with no VTE prophylaxis indicated and Pneumatic Compression Devices  Laws Catheter: Not present  Lines: None     Cardiac Monitoring: None  Code Status: No CPR- Do NOT Intubate      Clinically Significant Risk Factors Present on Admission                                Disposition Plan      Expected Discharge Date: 05/15/2023        Discharge Comments: PT/OT/SW to see        The patient's care was discussed with the Attending Physician, Dr. Choi, Care Coordinator/, Patient and RN.    RAMON LingC  Hospitalist Service  [facility]  Securely message with [employer] (more info)  Text page via [employer] Paging/Directory   ______________________________________________________________________    Interval History   Slept well overnight. Pain controlled at rest but worse with movement. Required assist of 2 to stand this morning - feels very unsteady. No CP, SOB or  dizziness.    Physical Exam   Vital Signs: Temp: 98.1  F (36.7  C) Temp src: Oral BP: (!) 145/84 Pulse: 89   Resp: 20 SpO2: 98 % O2 Device: None (Room air)    Weight: 97 lbs 0 oz    General Appearance: A&O x3, NAD, resting in bed  Respiratory: CTA, diminished at bases, no accessory muscle use  Cardiovascular:  rrr s1 s2 II/VI NIVIA no LE edema  GI: soft, BS present  Skin: warm and dry, intact  Other: Normal mood and affect, answers questions appropriately, left arm in sling, LUE neurovascularly intact    Medical Decision Making       30 MINUTES SPENT BY ME on the date of service doing chart review, history, exam, documentation & further activities per the note.      Data     I have personally reviewed the following data over the past 24 hrs:    8.6  \   11.7   / 185     140 107 19.2 /  98   4.0 23 0.70 \       Trop: 23 (H) BNP: 1,084       Imaging results reviewed over the past 24 hrs:   Recent Results (from the past 24 hour(s))   XR Chest 1 View    Narrative    EXAM: XR CHEST 1 VIEW  LOCATION: [facility]  DATE/TIME: 5/13/2023 5:22 PM CDT    INDICATION: Trauma with pain.  COMPARISON: 05/08/2018      Impression    IMPRESSION: Cardiac enlargement with interval increase in pulmonary vascular congestion. Interstitial opacities in lower lungs compatible with edema. Findings compatible with component of CHF/volume overload. Aortic calcification. Increased opacities in   the lung bases likely relating to atelectasis. No definitive evidence for pneumothorax. Partially visualized oblique fracture middle third left clavicle with the superior apex angulation. Marked degenerative changes right shoulder.   XR Shoulder Left 3 Views    Narrative    EXAM: XR SHOULDER LEFT G/E 3 VIEWS  LOCATION: [facility]  DATE/TIME: 5/13/2023 5:24 PM CDT    INDICATION: Left shoulder pain after trauma.  COMPARISON: None.      Impression    IMPRESSION:  1.  Acute comminuted fracture of the left  lateral clavicle. This includes a moderate-sized butterfly fragment, which demonstrates rotation and displacement.  2.  Normal glenohumeral alignment.  3.  Bone demineralization.  4.  Coarse interstitial opacities in both lungs.   XR Pelvis and Hip Left 2 Views    Narrative    EXAM: XR PELVIS AND HIP LEFT 2 VIEWS  LOCATION: [facility]  DATE/TIME: 5/13/2023 5:25 PM CDT    INDICATION: Pelvic and left hip pain after trauma.  COMPARISON: None.      Impression    IMPRESSION:  1.  No fracture or joint malalignment.  2.  Mild left hip degenerative arthrosis.  3.  Bone demineralization.  4.  Advanced degenerative changes in the lower lumbar spine noted.  5.  Right ureteral stent.  6.  Stimulator device projecting over the right pelvis, with lead projecting over the sacrum.   XR Lumbar Spine 2/3 Views    Narrative    EXAM: XR LUMBAR SPINE 2/3 VIEWS  LOCATION: [facility]  DATE/TIME: 5/13/2023 5:25 PM CDT    INDICATION: Trauma.  COMPARISON: None.      Impression    IMPRESSION: On the frontal view, image quality is significantly degraded by overlapping ribs, obscuring the upper lumbar spine. Mild rightward scoliosis of the upper lumbar spine and levoscoliosis centered at L4-L5. Mild anterior spondylolisthesis at   L3-L4. Otherwise normal alignment.    Mild to moderate superior endplate compression fracture at T12, age-indeterminate. Otherwise normal vertebral body heights. Severe disc space narrowing at L2-L3 and L3-L4 with probable disc space ankylosis. Moderate to severe disc space narrowing at   other levels. Moderate facet arthropathy. The visualized sacrum and pelvis are unremarkable. Right-sided ureteral stent. Transsacral stimulator lead.   XR Elbow Left 2 Views    Narrative    EXAM: XR ELBOW LEFT 2 VIEWS  LOCATION: [facility]  DATE/TIME: 5/13/2023 5:26 PM CDT    INDICATION: Left elbow pain after trauma.  COMPARISON: None.      Impression     IMPRESSION: Normal joint spaces and alignment. No fracture or joint effusion. Bone demineralization.   CT Cervical Spine w/o Contrast    Narrative    EXAM: CT CERVICAL SPINE W/O CONTRAST  LOCATION: [facility]  DATE/TIME: 5/13/2023 5:30 PM CDT    INDICATION: Traumatic injury  COMPARISON: None.  TECHNIQUE: Routine CT Cervical Spine without IV contrast. Multiplanar reformats. Dose reduction techniques were used.    FINDINGS:  VERTEBRA: Exaggeration of the cervical spine lordosis. Stepwise trace anterolisthesis of the lower cervical and upper thoracic spine, likely degenerative in etiology. Diffuse bony demineralization. No evidence of an acute displaced fracture.     CANAL/FORAMINA: Multilevel degenerative changes of the cervical spine, with mild to moderate canal stenosis at C5-C6 and at least mild stenosis at C6-C7. Mild bilateral neural foraminal narrowing.    PARASPINAL: No prevertebral hematoma. Left clavicle fracture.      Impression    IMPRESSION:  1.  No evidence of an acute displaced fracture of the cervical spine.   CT Head w/o Contrast    Narrative    EXAM: CT HEAD W/O CONTRAST  LOCATION: [facility]  DATE/TIME: 5/13/2023 5:31 PM CDT    INDICATION: Traumatic injury  COMPARISON: None.  TECHNIQUE: Routine CT Head without IV contrast. Multiplanar reformats. Dose reduction techniques were used.    FINDINGS:  INTRACRANIAL CONTENTS: No intracranial hemorrhage, extraaxial collection, or mass effect.  No CT evidence of acute infarct. Severe presumed chronic small vessel ischemic changes. Mild generalized volume loss. No hydrocephalus.     VISUALIZED ORBITS/SINUSES/MASTOIDS: Prior bilateral cataract surgery. Visualized portions of the orbits are otherwise unremarkable. No paranasal sinus mucosal disease. No middle ear or mastoid effusion.    BONES/SOFT TISSUES: No acute abnormality.      Impression    IMPRESSION:  1.  No CT evidence for acute intracranial  process.  2.  Brain atrophy and presumed chronic microvascular ischemic changes as above.

## 2023-05-14 NOTE — H&P
"INTERNAL MEDICINE HISTORY AND PHYSICAL  [facility]      Breanna Her [MR#: [id number]  : 1924  89 year old female]  Date of Admission:  2023  Primary Care Provider:  Kelly Armas      Chief Complaint:   Fall.    History of Present Illness:   Ms. Breanna Her is a 89-year-old female patient history including aortic stenosis; CHF; spinal stenosis; stroke history; and osteoporosis; who presents after suffering a fall.    Patient has a bedroom in the upper level but patient predominantly stays on the main level and resides mostly in the living room where she sleeps on a \"day bed\" sofa.  Kitchen and bathroom around the level.  Son/daughter do make meals for the patient.  Patient typically uses a walker or cane and also has a push wheelchair.  At baseline she does have impairment in her balance and gait.    In the above context, patient was sleeping (on the couch as per usual) and somehow fell off her couch at around 5 in the morning.  Left shoulder pain and bruising as well as pain in her left rib and left hip area.  Patient since the fall has had trouble getting up on her own and transferring (patient sometimes needs help at baseline).  Given her issues, paramedics were called and she was brought to [facility] for further evaluation.    Patient was seen in the ER by Dr. Devlin. On initial evaluation was afebrile, hypertensive.  ECG showed sinus rhythm without acute ischemic changes.  Labs notable for BMP which was unremarkable; CBC unremarkable; N-terminal proBNP 1084, troponin 23.    Chest x-ray showed interval increase in pulmonary vascular congestion and interstitial opacities in the lower lungs compatible with edema; partially visualized oblique fracture middle third left clavicle with superior apex angulation; no pneumothorax.  Left shoulder x-ray showed acute comminuted fracture of the left lateral clavicle with moderate-sized butterfly fragment " that demonstrated rotation and displacement. Pelvis/hip x-ray was negative for acute fracture.  Lumbar spine x-rays showed mild to moderate superior endplate compression fracture at T12 age-indeterminate and other chronic findings.  Cervical spine CT was negative for acute fracture.  Head CT was negative for acute findings.    Dr. Devlin contacted orthopedic surgery.  Preliminarily, it is felt that this would be treated conservatively/nonoperatively for now.  Given the patient's pain and debilitation, request for observation admission was made.    Patient presently has slight impairment in her mentation.  This is due to the fact that she had just received IV fentanyl.  She has some delay in her responses and does not answer many questions.  This is not her baseline in speaking with patient's family.  She has not had any complaints of anterior chest pain or shortness of breath.  She has had some arthralgias and neck pain noted.  Patient does suffer intermittently from heartburn.      Past Medical History:   1. Aortic stenosis. Echo 11/2019 showed LVEF 70-75%, grade 1 diastolic dysfunction; probably bicuspid AV with moderate AS, mild-moderate AR; MS, mild MR; moderate-severe TR; ascending aorta 3.9 cm.   2. CHF (HFpEF). Echo 11/2019 as above.  3. GERD with hiatal hernia.  4. Spinal stenosis. H/o sacral nerve stimulator, apparently inactive.  5. Stroke history.  6. RLS.  7. OA.  8. Osteoporosis.  9. H/o urinary retention. Previously needed intermittent straight catheterizations but no longer needed.     Past Medical History:   Diagnosis Date     Congestive heart failure, unspecified      Esophageal reflux      Fecal incontinence      Herpes zoster with other nervous system complications(053.19)      Osteoarthrosis, unspecified whether generalized or localized, lower leg      Osteoporosis, unspecified      Restless leg      Self-catheterises urinary bladder      Spinal stenosis, lumbar region, without neurogenic  "claudication      Stroke (H)      Unspecified essential hypertension      Unspecified urinary incontinence      UTI (lower urinary tract infection)      Above past medical history reviewed and edited and/or added to as necessary.    Past Surgical History:     Past Surgical History:   Procedure Laterality Date     APPENDECTOMY       CATARACT IOL, RT/LT       CHOLECYSTECTOMY       CYSTOCELE REPAIR       CYSTOSCOPY, BIOPSY BLADDER, COMBINED N/A 1/7/2015    Procedure: COMBINED CYSTOSCOPY, BIOPSY BLADDER;  Surgeon: Aysha Noonan MD;  Location:  OR     CYSTOSCOPY, RETROGRADES, COMBINED Bilateral 1/7/2015    Procedure: COMBINED CYSTOSCOPY, RETROGRADES;  Surgeon: Aysha Noonan MD;  Location:  OR     GENITOURINARY SURGERY      INTERSTIM IMPLANT     HYSTERECTOMY       IMPLANT STIMULATOR AND LEADS SACRAL NERVE (STAGE ONE AND TWO)  4/10/2013    Procedure: IMPLANT STIMULATOR AND LEADS SACRAL NERVE (STAGE ONE AND TWO);  AND REMOVAL OF EXISTING INTERSTIM SYSTEM.;  Surgeon: Aysha Noonan MD;  Location:  OR     LAMINECTOMY LUMBAR ONE LEVEL          Allergies:     Allergies   Allergen Reactions     Pcn [Penicillins]         Medications:     Prior to Admission Medications   Prescriptions Last Dose Informant Patient Reported? Taking?   Omeprazole (PRILOSEC PO)  Daughter Yes No   Sig: Take 20 mg by mouth daily as needed      Facility-Administered Medications: None       Social History:   Widows. 2 children. Lives in a house with her son.  Patient has a bedroom in the upper level but patient predominantly stays on the main level and resides mostly in the living room where she sleeps on a \"day bed\" sofa.  Kitchen and bathroom around the level.  Son/daughter do make meals for the patient.  Patient typically uses a walker or cane and also has a push wheelchair.  At baseline she does have impairment in her balance and gait.  Social History     Socioeconomic History     Marital status:      Spouse name: " "Not on file     Number of children: Not on file     Years of education: Not on file     Highest education level: Not on file   Occupational History     Not on file   Tobacco Use     Smoking status: Former     Types: Cigarettes     Quit date: 1984     Years since quittin.3     Smokeless tobacco: Not on file   Vaping Use     Vaping status: Not on file   Substance and Sexual Activity     Alcohol use: Yes     Comment: RARE     Drug use: No     Sexual activity: Not on file   Other Topics Concern     Not on file   Social History Narrative     Not on file     Social Determinants of Health     Financial Resource Strain: Not on file   Food Insecurity: Not on file   Transportation Needs: Not on file   Physical Activity: Not on file   Stress: Not on file   Social Connections: Not on file   Intimate Partner Violence: Not on file   Housing Stability: Not on file       Family History:   Reviewed.  No family history on file.    Review of Systems:   As noted in the HPI; otherwise 10 point review of systems was negative.     Physical Exam:   VITALS:  Blood pressure (!) 177/93, pulse 98, temperature 98.6  F (37  C), temperature source Oral, resp. rate 16, height 1.473 m (4' 10\"), weight 47.6 kg (105 lb), SpO2 97 %.  General: Awake, slow to respond to questions and at times does not answer questions.  She does follow commands.  HEENT: Pupils are equally round and reactive, no scleral icterus or conjunctival injection.  Oropharynx without obvious erythema or exudate.  Neck: No bruits, JVD or adenopathy.  Heart/Chest: Regular rate and rhythm with plus 1 out of 6 to 2 out of 6 systolic murmur, no gallops or rubs.  Lungs: Diminished in bases, no crackles or wheezes.  Abdomen: Soft, nontender, nondistended, possible suspect  Extremities/Musculoskeletal: No bilateral lower extremity edema.  Left shoulder swelling and ecchymosis.  Skin:    Neurologic:       Labs, Imaging & Other Data:     Results for orders placed or performed " during the hospital encounter of 05/13/23   CT Head w/o Contrast     Status: None    Narrative    EXAM: CT HEAD W/O CONTRAST  LOCATION: [facility]  DATE/TIME: 5/13/2023 5:31 PM CDT    INDICATION: Traumatic injury  COMPARISON: None.  TECHNIQUE: Routine CT Head without IV contrast. Multiplanar reformats. Dose reduction techniques were used.    FINDINGS:  INTRACRANIAL CONTENTS: No intracranial hemorrhage, extraaxial collection, or mass effect.  No CT evidence of acute infarct. Severe presumed chronic small vessel ischemic changes. Mild generalized volume loss. No hydrocephalus.     VISUALIZED ORBITS/SINUSES/MASTOIDS: Prior bilateral cataract surgery. Visualized portions of the orbits are otherwise unremarkable. No paranasal sinus mucosal disease. No middle ear or mastoid effusion.    BONES/SOFT TISSUES: No acute abnormality.      Impression    IMPRESSION:  1.  No CT evidence for acute intracranial process.  2.  Brain atrophy and presumed chronic microvascular ischemic changes as above.   CT Cervical Spine w/o Contrast     Status: None    Narrative    EXAM: CT CERVICAL SPINE W/O CONTRAST  LOCATION: [facility]  DATE/TIME: 5/13/2023 5:30 PM CDT    INDICATION: Traumatic injury  COMPARISON: None.  TECHNIQUE: Routine CT Cervical Spine without IV contrast. Multiplanar reformats. Dose reduction techniques were used.    FINDINGS:  VERTEBRA: Exaggeration of the cervical spine lordosis. Stepwise trace anterolisthesis of the lower cervical and upper thoracic spine, likely degenerative in etiology. Diffuse bony demineralization. No evidence of an acute displaced fracture.     CANAL/FORAMINA: Multilevel degenerative changes of the cervical spine, with mild to moderate canal stenosis at C5-C6 and at least mild stenosis at C6-C7. Mild bilateral neural foraminal narrowing.    PARASPINAL: No prevertebral hematoma. Left clavicle fracture.      Impression    IMPRESSION:  1.  No evidence  of an acute displaced fracture of the cervical spine.   XR Shoulder Left 3 Views     Status: None    Narrative    EXAM: XR SHOULDER LEFT G/E 3 VIEWS  LOCATION: [facility]  DATE/TIME: 5/13/2023 5:24 PM CDT    INDICATION: Left shoulder pain after trauma.  COMPARISON: None.      Impression    IMPRESSION:  1.  Acute comminuted fracture of the left lateral clavicle. This includes a moderate-sized butterfly fragment, which demonstrates rotation and displacement.  2.  Normal glenohumeral alignment.  3.  Bone demineralization.  4.  Coarse interstitial opacities in both lungs.   XR Elbow Left 2 Views     Status: None    Narrative    EXAM: XR ELBOW LEFT 2 VIEWS  LOCATION: [facility]  DATE/TIME: 5/13/2023 5:26 PM CDT    INDICATION: Left elbow pain after trauma.  COMPARISON: None.      Impression    IMPRESSION: Normal joint spaces and alignment. No fracture or joint effusion. Bone demineralization.   XR Pelvis and Hip Left 2 Views     Status: None    Narrative    EXAM: XR PELVIS AND HIP LEFT 2 VIEWS  LOCATION: [facility]  DATE/TIME: 5/13/2023 5:25 PM CDT    INDICATION: Pelvic and left hip pain after trauma.  COMPARISON: None.      Impression    IMPRESSION:  1.  No fracture or joint malalignment.  2.  Mild left hip degenerative arthrosis.  3.  Bone demineralization.  4.  Advanced degenerative changes in the lower lumbar spine noted.  5.  Right ureteral stent.  6.  Stimulator device projecting over the right pelvis, with lead projecting over the sacrum.   XR Lumbar Spine 2/3 Views     Status: None    Narrative    EXAM: XR LUMBAR SPINE 2/3 VIEWS  LOCATION: [facility]  DATE/TIME: 5/13/2023 5:25 PM CDT    INDICATION: Trauma.  COMPARISON: None.      Impression    IMPRESSION: On the frontal view, image quality is significantly degraded by overlapping ribs, obscuring the upper lumbar spine. Mild rightward scoliosis of the upper  lumbar spine and levoscoliosis centered at L4-L5. Mild anterior spondylolisthesis at   L3-L4. Otherwise normal alignment.    Mild to moderate superior endplate compression fracture at T12, age-indeterminate. Otherwise normal vertebral body heights. Severe disc space narrowing at L2-L3 and L3-L4 with probable disc space ankylosis. Moderate to severe disc space narrowing at   other levels. Moderate facet arthropathy. The visualized sacrum and pelvis are unremarkable. Right-sided ureteral stent. Transsacral stimulator lead.   XR Chest 1 View     Status: None    Narrative    EXAM: XR CHEST 1 VIEW  LOCATION: [facility]  DATE/TIME: 5/13/2023 5:22 PM CDT    INDICATION: Trauma with pain.  COMPARISON: 05/08/2018      Impression    IMPRESSION: Cardiac enlargement with interval increase in pulmonary vascular congestion. Interstitial opacities in lower lungs compatible with edema. Findings compatible with component of CHF/volume overload. Aortic calcification. Increased opacities in   the lung bases likely relating to atelectasis. No definitive evidence for pneumothorax. Partially visualized oblique fracture middle third left clavicle with the superior apex angulation. Marked degenerative changes right shoulder.   Basic metabolic panel     Status: Abnormal   Result Value Ref Range    Sodium 138 136 - 145 mmol/L    Potassium 5.0 3.4 - 5.3 mmol/L    Chloride 102 98 - 107 mmol/L    Carbon Dioxide (CO2) 26 22 - 29 mmol/L    Anion Gap 10 7 - 15 mmol/L    Urea Nitrogen 19.3 8.0 - 23.0 mg/dL    Creatinine 0.63 0.51 - 0.95 mg/dL    Calcium 8.9 8.2 - 9.6 mg/dL    Glucose 116 (H) 70 - 99 mg/dL    GFR Estimate 79 >60 mL/min/1.73m2   CBC with platelets and differential     Status: Abnormal   Result Value Ref Range    WBC Count 9.7 4.0 - 11.0 10e3/uL    RBC Count 4.50 3.80 - 5.20 10e6/uL    Hemoglobin 14.8 11.7 - 15.7 g/dL    Hematocrit 45.5 35.0 - 47.0 %     (H) 78 - 100 fL    MCH 32.9 26.5 - 33.0 pg     MCHC 32.5 31.5 - 36.5 g/dL    RDW 14.3 10.0 - 15.0 %    Platelet Count 156 150 - 450 10e3/uL    % Neutrophils 79 %    % Lymphocytes 14 %    % Monocytes 7 %    % Eosinophils 0 %    % Basophils 0 %    % Immature Granulocytes 0 %    NRBCs per 100 WBC 0 <1 /100    Absolute Neutrophils 7.5 1.6 - 8.3 10e3/uL    Absolute Lymphocytes 1.4 0.8 - 5.3 10e3/uL    Absolute Monocytes 0.7 0.0 - 1.3 10e3/uL    Absolute Eosinophils 0.0 0.0 - 0.7 10e3/uL    Absolute Basophils 0.0 0.0 - 0.2 10e3/uL    Absolute Immature Granulocytes 0.0 <=0.4 10e3/uL    Absolute NRBCs 0.0 10e3/uL   Troponin T, High Sensitivity     Status: Abnormal   Result Value Ref Range    Troponin T, High Sensitivity 23 (H) <=14 ng/L   Nt probnp inpatient (BNP)     Status: Normal   Result Value Ref Range    N terminal Pro BNP Inpatient 1,084 0 - 1,800 pg/mL   EKG 12-lead, tracing only     Status: None   Result Value Ref Range    Systolic Blood Pressure  mmHg    Diastolic Blood Pressure  mmHg    Ventricular Rate 102 BPM    Atrial Rate 102 BPM    MT Interval 140 ms    QRS Duration 74 ms     ms    QTc 453 ms    P Axis 52 degrees    R AXIS -37 degrees    T Axis 63 degrees    Interpretation ECG       Sinus tachycardia  Possible Left atrial enlargement  Left axis deviation  Minimal voltage criteria for LVH, may be normal variant ( R in aVL )  Abnormal ECG  When compared with ECG of 08-MAY-2018 15:53,  No significant change was found  Confirmed by GENERATED REPORT, COMPUTER ([id number]),  Fariha Mascorro ([id number]) on 5/13/2023 5:23:50 PM     CBC with platelets differential     Status: Abnormal    Narrative    The following orders were created for panel order CBC with platelets differential.  Procedure                               Abnormality         Status                     ---------                               -----------         ------                     CBC with platelets and d...[[id number]]  Abnormal            Final result                 Please view results  for these tests on the individual orders.     ECG personally reviewed as described above.        ASSESSMENT & PLAN:   Ms. Breanna Her is a 89-year-old female patient history including aortic stenosis; CHF; spinal stenosis; stroke history; and osteoporosis; who presents after suffering a fall and found to have suffered an acute comminuted left clavicle fracture.    On initial evaluation was afebrile, hypertensive.  ECG showed sinus rhythm without acute ischemic changes.  Labs notable for BMP which was unremarkable; CBC unremarkable; N-terminal proBNP 1084, troponin 23.    Chest x-ray showed interval increase in pulmonary vascular congestion and interstitial opacities in the lower lungs compatible with edema; partially visualized oblique fracture middle third left clavicle with superior apex angulation; no pneumothorax.      Left shoulder x-ray showed acute comminuted fracture of the left lateral clavicle with moderate-sized butterfly fragment that demonstrated rotation and displacement. Pelvis/hip x-ray was negative for acute fracture.  Lumbar spine x-rays showed mild to moderate superior endplate compression fracture at T12 age-indeterminate and other chronic findings.  Cervical spine CT was negative for acute fracture.  Head CT was negative for acute findings.      Fall with acute comminuted left clavicle fracture.  T12 compression fracture, possibly chronic.  * Initial presentation as above.  - Start acetaminophen 1000 mg TID; lidocaine patch 4% daily; PRN acetaminophen; PRN tramadol; PRN IV hydromorphone; minimize opioids as able.  - Orthopedic Surgery consult; at this time, it appears that this will be managed nonoperatively.  - Ask PT, OT and social work to see the patient; patient likely needs a transitional care unit.    Troponin elevation, suspect demand ischemia (type 2 NSTEMI).  - Continue to monitor clinically.    Possible pulmonary edema on CXR.  Moderate-severe AS.  CHF (HFpEF).  * Echo 11/2019  showed LVEF 70-75%, grade 1 diastolic dysfunction; probably bicuspid AV with moderate AS, mild-moderate AR; MS, mild MR; moderate-severe TR; ascending aorta 3.9 cm.   * Initial presentation as above. CXR showed interval increase in pulmonary vascular congestion and interstitial opacities in the lower lungs compatible with edema; partially visualized oblique fracture middle third left clavicle with superior apex angulation; no pneumothorax. Pt not hypoxic, no dyspnea, does not appear volume overloaded or in acute CHF on exam.  - Continue to monitor clinically.  - Does not appear that AS is being actively followed.    GERD with hiatal hernia.  - Continue omeprazole/pantoprazole.    Other chronic medical issues:  Spinal stenosis. H/o sacral nerve stimulator, apparently inactive.  Stroke history.  RLS.  OA.  Osteoporosis.  H/o urinary retention.  - Noted.    Prophylaxis.  - PCD's, ambulation..    CODE STATUS: DNR-DNI (pt has POLST, d/w pt and family).      Madi Myrick Jr., MD  [phone] (p)  Text Page  [place]

## 2023-05-14 NOTE — PROGRESS NOTES
RECEIVING UNIT ED HANDOFF REVIEW    ED Nurse Handoff Report was reviewed by: Jennifer Yusuf RN on May 13, 2023 at 8:59 PM

## 2023-05-14 NOTE — PLAN OF CARE
Goal Outcome Evaluation:    servation goals  PRIOR TO DISCHARGE        Comments: -diagnostic tests and consults completed and resulted - met  -vital signs normal or at patient baseline - met- pending placement  Nurse to notify provider when observation goals have been met and patient is ready for discharge.

## 2023-05-14 NOTE — PROGRESS NOTES
"   05/14/23 1000   Appointment Info   Signing Clinician's Name / Credentials (PT) Cristian Cleveland DPT   Rehab Comments (PT) NWBing on LUE       Present no   Living Environment   People in Home child(fanny), adult  (Son)   Current Living Arrangements house   Home Accessibility stairs to enter home   Number of Stairs, Main Entrance 5   Stair Railings, Main Entrance railing on right side (ascending)   Transportation Anticipated family or friend will provide   Living Environment Comments Pt lives in a house with her son. Stairs to enter. Pt reports her son will pick her up upon discharge. Per pt, pt's son is able to provide assist but not 24/7 assist.   Self-Care   Usual Activity Tolerance moderate   Current Activity Tolerance poor   Equipment Currently Used at Home walker, rolling   Fall history within last six months yes   Number of times patient has fallen within last six months 3   Activity/Exercise/Self-Care Comment Pt reports being able to dress self but otherwise receives assist with ADLs. Pt ambulates w/ a FWW but reports she often \"furniture walks.\"   General Information   Onset of Illness/Injury or Date of Surgery 05/14/23   Referring Physician Madi Myrick MD   Patient/Family Therapy Goals Statement (PT) \"To go home\"   Pertinent History of Current Problem (include personal factors and/or comorbidities that impact the POC) Per Chart: Ms. Breanna Her is a 89-year-old female patient history including aortic stenosis; CHF; spinal stenosis; stroke history; and osteoporosis; who presents after suffering a fall.   Existing Precautions/Restrictions fall;weight bearing   Weight-Bearing Status - LUE nonweight-bearing   Weight-Bearing Status - LLE full weight-bearing   Weight-Bearing Status - RLE full weight-bearing   Cognition   Orientation Status (Cognition) oriented x 3   Pain Assessment   Patient Currently in Pain Yes, see Vital Sign flowsheet  (Pt reporting L shoulder pain) "   Integumentary/Edema   Integumentary/Edema Comments LUE in sling   Posture    Posture Forward head position;Protracted shoulders   Range of Motion (ROM)   Range of Motion ROM is WFL   Strength (Manual Muscle Testing)   Strength (Manual Muscle Testing) Deficits observed during functional mobility   Strength Comments BLE Hip Flexion: 3/5   Bed Mobility   Comment, (Bed Mobility) Supine>sit w/ max A x 1   Transfers   Comment, (Transfers) Sit>stand w/ hand held assist and mod A x 1   Gait/Stairs (Locomotion)   Comment, (Gait/Stairs) Unable to intiate   Balance   Balance Comments Mild unsteadiness in sitting at EOB; pt unsteady in standing   Sensory Examination   Sensory Perception patient reports no sensory changes   Clinical Impression   Criteria for Skilled Therapeutic Intervention Yes, treatment indicated   PT Diagnosis (PT) Impaired gait   Influenced by the following impairments Increased pain; decreased activity tolerance; decreased balance; decreased strength   Functional limitations due to impairments Impaired functional mobility   Clinical Presentation (PT Evaluation Complexity) Stable/Uncomplicated   Clinical Presentation Rationale Clinical judgement   Clinical Decision Making (Complexity) low complexity   Planned Therapy Interventions (PT) balance training;bed mobility training;gait training;patient/family education;stair training;strengthening;transfer training;progressive activity/exercise   Risk & Benefits of therapy have been explained evaluation/treatment results reviewed;care plan/treatment goals reviewed;risks/benefits reviewed;current/potential barriers reviewed;participants voiced agreement with care plan;participants included;patient   PT Total Evaluation Time   PT Eval, Low Complexity Minutes (74810) 10   Physical Therapy Goals   PT Frequency 3x/week   PT Predicted Duration/Target Date for Goal Attainment 05/19/23   PT Goals Bed Mobility;Transfers;Gait;Stairs   PT: Bed Mobility Supervision/stand-by  assist;Supine to/from sit;Within precautions   PT: Transfers Supervision/stand-by assist;Sit to/from stand;Assistive device;Within precautions   PT: Gait Supervision/stand-by assist;Assistive device;Within precautions;100 feet   PT: Stairs Minimal assist;5 stairs;Rail on right;Within precautions   Interventions   Interventions Quick Adds Therapeutic Activity   Therapeutic Activity   Therapeutic Activities: dynamic activities to improve functional performance Minutes (36038) 30   Symptoms Noted During/After Treatment Increased pain   Treatment Detail/Skilled Intervention Greeted pt supine in bed, agreed to PT. VSS on RA throughout session. Pt's LUE in sling, PT spending additional time repositioning for improved fit. PT educated pt on NWBing on LLE, further education required. Pt performed supine>sit w/ max A x 1, needing assist to safely lift BLEs off of bed and trunk control. Additional assist to scoot to EOB. Pt needing intermittent assist to maintain balance sitting at EOB. Pt performed sit>stand x 3 w/ hand held assist from PT on RUE. Pt unsteady in standing but able to stand for ~10 seconds before needing to sit. Pt unable to tolerate further activity. Pt returned to supine w/ max A x 2, needing assist to safely lift BLEs back into bed and trunk control. Additional assist to boost up in bed. Pt ended session supine in bed, with all needs met and RN present.   PT Discharge Planning   PT Plan Bed mobility; repeat sit>stands; pre-gait exercises; initiate gait as able   PT Discharge Recommendation (DC Rec) Transitional Care Facility   PT Rationale for DC Rec Pt is below baseline. Pt currently requiring heavy assist with all functional mobility. Pt presents with deficits in activity tolerance, balance, and strength. Due to these deficits, pt is a high falls risk and unable to currently ambulate. Pt would benefit from continued skilled PT services via TCU to address deficits and improve IND with safety and functional  mobility.   PT Brief overview of current status Supine>sit w/ max A x 1; sit>stand w/ mod A x 1   Total Session Time   Timed Code Treatment Minutes 30   Total Session Time (sum of timed and untimed services) 40

## 2023-05-14 NOTE — CONSULTS
Consult Date: 05/14/2023    CHIEF COMPLAINT:  Left clavicle fracture.    HISTORY OF PRESENT ILLNESS:  Ms. Her is a 89-year-old female with a history of aortic stenosis, spinal stenosis, stroke, osteoporosis, who presented after a fall.  I was asked by Dr. Madi Myrick, the hospitalist, to see her in consultation to help with an orthopedic plan.  Breanna was in her bedroom when she slipped apparently off the day bed, landing directly on her shoulder.  The patient usually uses a walker, was a fairly sore and paramedics had to come in to get her stabilized and she was brought to the ED.  ED workup was performed including other x-rays of her pelvis and elbow, which were negative; however, her left elbow revealed acute comminuted fracture of the left clavicle.  She was admitted for pain control and disposition.    PAST MEDICAL HISTORY:  Including aortic stenosis, GERD, spinal stenosis, osteoporosis, congestive heart failure.    PAST SURGICAL HISTORY:  Includes appendectomy, cystoscopy, sacral nerve stimulators, laminectomy.    ALLERGIES:  PENICILLIN.    MEDICATIONS:  Include Prilosec.    SOCIAL HISTORY:  She is , has 2 children who are present in the room today.    FAMILY HISTORY:  Uncomplicated.    REVIEW OF SYSTEMS:  Please see admission H and P essentially negative today other than left shoulder pain and some mild low back pain.    PHYSICAL EXAMINATION:  GENERAL:  She is lying comfortably.  She has a sling.  She has a significant amount of ecchymosis over the shoulder girdle itself.  MUSCULOSKELETAL:  She has no tenderness over the elbow or wrist area.  She has good cap refill in the digits.  I did some gentle rotation of her arm, which gave her minimal discomfort.  X-rays were reviewed including a negative pelvis, negative elbow x-rays. I did review the shoulder internal, external, and scapular Y views, which shows a comminuted segmental fracture of the lateral third of the clavicle.    IMPRESSION:  Left  clavicle fracture, closed.  Secondary to low energy trauma at home.    PLAN:  I discussed with Bimal and her family today that I would recommend nonsurgical treatment for 2 reasons.  Mainly the fact of her age and my concern over skin issues as well as the complex nature of this fracture, which does not ana well for any of our surgical techniques or plating systems as it is fairly lateral and fairly comminuted and with her osteoporosis, I do not believe we could get good surgical fixation.  I do think this has a very good chance to heal on its own.  It may take up to 3 months.  Sling is for comfort.  We will control her pain.  Mobilized with PT, she can see me as an outpatient in the next 2-3 weeks for an x-ray if that is feasible for the family depending on her cares.    Fernando Cleveland MD        D: 2023   T: 2023   MT: [email or web address]    Name:     BIMAL STONE  MRN:      [id number]        Account:      [id number]   :      1924           Consult Date: 2023     Document: [id number]    cc:  Madi Myrick MD

## 2023-05-14 NOTE — PLAN OF CARE
OT: Order received, chart reviewed and discussed with care team. Per discussion with PT, patient will likely need TCU following admission. No acute care OT needs. All mobility will be addressed by PT. Defer discharge recommendations to PT and next level of care. Will complete OT orders.

## 2023-05-14 NOTE — PLAN OF CARE
Arrived to floor from ED around 2200. VSS on RA. Alert, oriented x3. Per pt's daughter Angela, pt's mentation waxes and wanes baseline. Very tired on arrival, appears very frail. Follows commands and answers questions appropriately, speech slow. Blanchable redness to coccyx, intact. L shoulder swelling and bruising. L shoulder pain controlled w/ tylenol and lidocaine patch. Turn/repo as tolerated. PIV SL. Not OOB this shift. LUE in sling; optifoam applied to neck to minimize friction and discomfort. Cardiac diet. Ate a few bites of pudding. Pills crushed in pudding or applesauce. Incontinent of bladder, no BM overnight. Appeared to sleep well overnight between cares. Plan for PT/OT, SW/CC, and ortho consults.

## 2023-05-15 NOTE — PROGRESS NOTES
OBS GOALS    -diagnostic tests and consults completed and resulted: Partially MET    -vital signs normal or at patient baseline: MET

## 2023-05-15 NOTE — PLAN OF CARE
Goal Outcome Evaluation:    Observation goals  PRIOR TO DISCHARGE        Comments: -diagnostic tests and consults completed and resulted - met  -vital signs normal or at patient baseline - met  Nurse to notify provider when observation goals have been met and patient is ready for discharge.

## 2023-05-15 NOTE — PLAN OF CARE
[id number]: No acute events overnight. Slept well.     VSS on RA. Alert, oriented x2-3. Per pt's daughter Angela, pt's mentation waxes and wanes baseline. Very frail appearing. Follows commands and answers questions appropriately, speech slow. Blanchable redness to coccyx, intact. L shoulder swelling and bruising. L shoulder pain controlled w/ scheduled tylenol and lidocaine patch. PRN ultram available. Turn/repo as tolerated. PIV SL. Not OOB this shift. LUE in sling; optifoam in place to minimize friction and discomfort. Cardiac diet. Pills crushed in pudding or applesauce. Voiding. Incontinent of bladder, no BM overnight. Ortho and PT have seen. SW/CC consult for discharge to TCU.

## 2023-05-15 NOTE — PROGRESS NOTES
SW: Received VM from daughter Angela ([phone] - phone number on chart may be inaccurate). Call back, left VM. Angela may be at the hospital this afternoon so SW will try to stop by then for initial consult/discharge planning.    Marina Leigh, DESTINY  Social Work  [facility]

## 2023-05-15 NOTE — PLAN OF CARE
VSS on RA.   Alert, oriented x3.   Awake most of day; very frail. Follows commands and answers questions appropriately, speech slow.   Mepilex placed low mid back and coccyx-(Blanchable redness intact).  L shoulder swelling and bruising Sling in place and slight pillow to support elbow which relieves pain to clavicle  L Clavicle pain controlled w/ tylenol and lidocaine patch-Removed at 1100   Turn/repo as tolerated. Up X1 Pivot to Chair today   PIV SL.    Optifoam applied to neck to minimize friction and discomfort.   Cardiac diet.   Pills crushed in applesauce. Incontinent of bladder, no BM today-Miralax given.    SW/CC following

## 2023-05-15 NOTE — PROGRESS NOTES
Orientation/Cognitive: A&Ox 2-3.  Observation Goals (Met/ Not Met): Not Met  Mobility Level/Assist Equipment: A-1 GB/walker  Fall Risk (Y/N): Y  Behavior Concerns: None  Pain Management: PRN tylenol, tramadol, Lidocaine patch in place on left shoulder.  Tele/VS/O2: VSS on RA  ABNL Lab/BG: N/A  Diet: Cardiac Diet  Bowel/Bladder: Incont. Bladder.  Skin Concerns: Bruising to Left shoulder and right forearm. Has sling on left arm.  Drains/Devices: None  Tests/Procedures for next shift: None  Anticipated DC date & active delays: Pending Placement  Patient Stated Goal for Today: None

## 2023-05-15 NOTE — CONSULTS
Care Management Initial Consult    General Information  Assessment completed with: Family, Summer (daughter in-law)  Type of CM/SW Visit: Initial Assessment    Primary Care Provider verified and updated as needed:     Readmission within the last 30 days:        Reason for Consult: discharge planning  Advance Care Planning: Advance Care Planning Reviewed: no concerns identified          Communication Assessment  Patient's communication style: spoken language (English or Bilingual)    Hearing Difficulty or Deaf: yes        Cognitive  Cognitive/Neuro/Behavioral: .WDL except  Level of Consciousness: alert  Arousal Level: arouses to touch/gentle shaking  Orientation: disoriented to, time     Best Language: 0 - No aphasia  Speech: logical, clear    Living Environment:   People in home: child(fanny), adult   (Son)  Current living Arrangements: house      Able to return to prior arrangements: yes (needs TCU first)       Family/Social Support:  Care provided by: child(fanny), other (see comments) (Home health aide)  Provides care for: no one, unable/limited ability to care for self  Marital Status:   Children, Other (specify) (HHA)          Description of Support System: Supportive    Support Assessment: Adequate family and caregiver support    Current Resources:   Patient receiving home care services: No     Community Resources:    Equipment currently used at home: cane, straight, walker, standard, wheelchair, manual  Supplies currently used at home:      Employment/Financial:  Employment Status: retired        Financial Concerns:             Does the patient's insurance plan have a 3 day qualifying hospital stay waiver?  No    Lifestyle & Psychosocial Needs:  Social Determinants of Health     Tobacco Use: Not on file   Alcohol Use: Not on file   Financial Resource Strain: Not on file   Food Insecurity: Not on file   Transportation Needs: Not on file   Physical Activity: Not on file   Stress: Not on file   Social  Connections: Not on file   Intimate Partner Violence: Not on file   Depression: Not on file   Housing Stability: Not on file       Functional Status:  Prior to admission patient needed assistance:   Dependent ADLs:: Ambulation-walker, Wheelchair-independent, Incontinence, Grooming, Bathing, Dressing  Dependent IADLs:: Cleaning, Cooking, Laundry, Shopping, Meal Preparation, Transportation, Incontinence       Mental Health Status:          Chemical Dependency Status:                Values/Beliefs:  Spiritual, Cultural Beliefs, Zoroastrian Practices, Values that affect care:                 Additional Information:  Spoke with daughter in law, Summer, for initial consult/discharge planning, and daughter Angela on the phone. Pt was admitted on 5/13/23 for a fall. Pt lives with adult son in a house, son has some mental health challenges and has had three hospitalizations recently. Pt receives help from home health aide 2x week from 10-4pm on T/Th. Family visit on M and W during the week, all day. Pt receives help with incontinence, dinners, cleaning, grocery shopping, cooking, yard work. Pt does not have home care. Patient mainly lives on main level, does not go up stairs, has daybed on main level and bathroom. Pt uses a walker, cane, and wheelchair at home. Has been to [facility] about five years ago. ALIYA spoke of TCU recommendation, informed of Medicare waiver updates. Informed that due to observations status, may be private pay, provided idea of costs associated with up front deposit/daily rate. Summer and Angela agreeable to place referral to [facility] to get idea of availability and deposit cost, Summer reports having financial means to cover private pay costs. Summer wondered about pt's eligibility for MA, ALIYA informed they could make a referral to financial counselor. Sent referral to [facility] and will continue to follow.     Sent referral to [facility] financial counselor.     DESTINY Fregoso  Social Work  [facility]  [place]

## 2023-05-15 NOTE — PROGRESS NOTES
[facility]    Medicine Progress Note - Hospitalist Service    Date of Admission:  5/13/2023    Assessment & Plan   Breanna Her is a 89-year-old female patient history including aortic stenosis; CHF; spinal stenosis; stroke history; and osteoporosis; who presents after suffering a fall and found to have suffered an acute comminuted left clavicle fracture.     On initial evaluation was afebrile, hypertensive.  ECG showed sinus rhythm without acute ischemic changes.  Labs notable for BMP which was unremarkable; CBC unremarkable; N-terminal proBNP 1084, troponin 23.     Chest x-ray showed interval increase in pulmonary vascular congestion and interstitial opacities in the lower lungs compatible with edema; partially visualized oblique fracture middle third left clavicle with superior apex angulation; no pneumothorax.       Left shoulder x-ray showed acute comminuted fracture of the left lateral clavicle with moderate-sized butterfly fragment that demonstrated rotation and displacement. Pelvis/hip x-ray was negative for acute fracture.  Lumbar spine x-rays showed mild to moderate superior endplate compression fracture at T12 age-indeterminate and other chronic findings.  Cervical spine CT was negative for acute fracture.  Head CT was negative for acute findings.        Fall with acute comminuted left clavicle fracture  T12 compression fracture, possibly chronic  * Initial presentation as above.  - Continue acetaminophen 1000 mg TID; lidocaine patch 4% daily; PRN acetaminophen; PRN tramadol; PRN IV hydromorphone; minimize opioids as able.  - Orthopedic Surgery consult; at this time, it appears that this will be managed nonoperatively.  - PT recommending TCU  - SW consulted and working on disposition planning    Troponin elevation, suspect demand ischemia (type 2 NSTEMI)  - Continue to monitor clinically.  No further CP or symptoms concerning for ACS     Possible pulmonary edema on  CXR  Moderate-severe AS  CHF (HFpEF). Not decompensated   * Echo 11/2019 showed LVEF 70-75%, grade 1 diastolic dysfunction; probably bicuspid AV with moderate AS, mild-moderate AR; MS, mild MR; moderate-severe TR; ascending aorta 3.9 cm.   * Initial presentation as above. CXR showed interval increase in pulmonary vascular congestion and interstitial opacities in the lower lungs compatible with edema; partially visualized oblique fracture middle third left clavicle with superior apex angulation; no pneumothorax. Pt not hypoxic, no dyspnea, does not appear volume overloaded or in acute CHF on exam.  - Continue to monitor clinically.  - Does not appear that AS is being actively followed.     GERD with hiatal hernia.  - Continue omeprazole/pantoprazole     Other chronic medical issues  Spinal stenosis. H/o sacral nerve stimulator, apparently inactive  Stroke history  RLS  OA  Osteoporosis  H/o urinary retention          Diet: Combination Diet Low Saturated Fat Na <2400mg Diet, No Caffeine Diet    DVT Prophylaxis: Pneumatic Compression Devices  Laws Catheter: Not present  Lines: None     Cardiac Monitoring: None  Code Status: No CPR- Do NOT Intubate      Clinically Significant Risk Factors Present on Admission                                Disposition Plan      Expected Discharge Date: 05/15/2023        Discharge Comments: Ortho consult  PT/OT/SW to see, will likely need placement          Clay Choi DO  Hospitalist Service  [facility]  Securely message with [employer] (more info)  Text page via [employer] Paging/Directory   ______________________________________________________________________    Interval History   Patient seen and examined.  No acute events over night.  No fevers.  No hypoxia.  Pain is present in left shoulder but patient notes it is tolerable.  No nausea or vomiting.     Physical Exam   Vital Signs: Temp: 98.1  F (36.7  C) Temp src: Oral BP: 100/60 Pulse: 82   Resp: 16 SpO2:  94 % O2 Device: None (Room air)    Weight: 97 lbs 0 oz    General Appearance: Resting comfortably. NAD  Respiratory: Clear to auscultation.  No respiratory distress  Cardiovascular: RRR.  Murmur present   GI: Soft. Non-distended  Skin:  No obvious rashes or cyanosis  Other: Alert.  No edema.  Left arm is in a sling      Medical Decision Making       40 MINUTES SPENT BY ME on the date of service doing chart review, history, exam, documentation & further activities per the note.      Data   ------------------------- PAST 24 HR DATA REVIEWED -----------------------------------------------        Imaging results reviewed over the past 24 hrs:   No results found for this or any previous visit (from the past 24 hour(s)).

## 2023-05-16 NOTE — PROGRESS NOTES
Care Management Follow Up    Length of Stay (days): 0    Expected Discharge Date: 05/16/2023     Concerns to be Addressed:       Patient plan of care discussed at interdisciplinary rounds: Yes    Anticipated Discharge Disposition: Transitional Care     Anticipated Discharge Services: Transportation Services  Anticipated Discharge DME:      Patient/family educated on Medicare website which has current facility and service quality ratings: yes  Education Provided on the Discharge Plan:    Patient/Family in Agreement with the Plan: yes    Referrals Placed by CM/SW:    Private pay costs discussed: private room/amenity fees and transportation costs    Additional Information:  ALIYA received call that pt's daughter is here and wants to talk with ALIYA. Spoke with Angela regarding discharge plans for tomorrow. Informed of deposit cost of $3,893.82 and assessment to determine daily rate once pt is at facility. Angela asked how a patient is determined inpatient vs. observations status. Informed of private room, no fee, due to medical necessity. Informed of transport options and costs associated. Angela plans to transport tomorrow, informed that hospital staff and TCU staff cannot help get in/out of vehicle. Call to [facility], they can accept pt 1300 or later. Updated Angela on this, she will plan to be at the hospital tomorrow afternoon for transport. Angela aware that payment is needed upon admission. ALIYA to continue to follow for discharge planning.     DESTINY Fregoso  Social Work  [facility]

## 2023-05-16 NOTE — PROGRESS NOTES
Observation goals  PRIOR TO DISCHARGE        Comments: -diagnostic tests and consults completed and resulted , met  -vital signs normal or at patient baseline , met  -safe discharge plan, not met, pending placement

## 2023-05-16 NOTE — PROGRESS NOTES
[facility]    Medicine Progress Note - Hospitalist Service    Date of Admission:  5/13/2023    Assessment & Plan   Breanna Her is a 89-year-old female patient history including aortic stenosis; CHF; spinal stenosis; stroke history; and osteoporosis; who presents after suffering a fall and found to have suffered an acute comminuted left clavicle fracture.     On initial evaluation was afebrile, hypertensive.  ECG showed sinus rhythm without acute ischemic changes.  Labs notable for BMP which was unremarkable; CBC unremarkable; N-terminal proBNP 1084, troponin 23.     Chest x-ray showed interval increase in pulmonary vascular congestion and interstitial opacities in the lower lungs compatible with edema; partially visualized oblique fracture middle third left clavicle with superior apex angulation; no pneumothorax.       Left shoulder x-ray showed acute comminuted fracture of the left lateral clavicle with moderate-sized butterfly fragment that demonstrated rotation and displacement. Pelvis/hip x-ray was negative for acute fracture.  Lumbar spine x-rays showed mild to moderate superior endplate compression fracture at T12 age-indeterminate and other chronic findings.  Cervical spine CT was negative for acute fracture.  Head CT was negative for acute findings.        Fall with acute comminuted left clavicle fracture  T12 compression fracture, possibly chronic  * Initial presentation as above.  - Continue acetaminophen 1000 mg TID; lidocaine patch 4% daily; PRN acetaminophen; PRN tramadol; PRN IV hydromorphone; minimize opioids as able.  - Orthopedic Surgery consult; at this time, it appears that this will be managed nonoperatively.  - PT recommending TCU  - SW consulted and working on disposition planning    Troponin elevation, suspect demand ischemia (type 2 NSTEMI)  - Continue to monitor clinically.  No further CP or symptoms concerning for ACS     Possible pulmonary edema on  CXR  Moderate-severe AS  CHF (HFpEF). Not decompensated   * Echo 11/2019 showed LVEF 70-75%, grade 1 diastolic dysfunction; probably bicuspid AV with moderate AS, mild-moderate AR; MS, mild MR; moderate-severe TR; ascending aorta 3.9 cm.   * Initial presentation as above. CXR showed interval increase in pulmonary vascular congestion and interstitial opacities in the lower lungs compatible with edema; partially visualized oblique fracture middle third left clavicle with superior apex angulation; no pneumothorax. Pt not hypoxic, no dyspnea, does not appear volume overloaded or in acute CHF on exam.  - Continue to monitor clinically.  - Does not appear that AS is being actively followed.     GERD with hiatal hernia.  - Continue omeprazole/pantoprazole     Other chronic medical issues  Spinal stenosis. H/o sacral nerve stimulator, apparently inactive  Stroke history  RLS  OA  Osteoporosis  H/o urinary retention          Diet: Combination Diet Low Saturated Fat Na <2400mg Diet, No Caffeine Diet    DVT Prophylaxis: Pneumatic Compression Devices  Laws Catheter: Not present  Lines: None     Cardiac Monitoring: None  Code Status: No CPR- Do NOT Intubate      Clinically Significant Risk Factors Present on Admission                                Disposition Plan      Expected Discharge Date: 05/17/2023,  1:00 PM      Discharge Comments: TCU placement accepted  at [facility]  Hospitalist Service  [facility]  Securely message with [employer] (more info)  Text page via [employer] Paging/Directory   ______________________________________________________________________    Interval History   Patient seen and examined.  No acute events over night.  Sitting in recliner.  Did not offer any complaints other than expected pain in left shoulder.    Physical Exam   Vital Signs: Temp: (P) 98  F (36.7  C) Temp src: (P) Oral BP: (P) 122/72 Pulse: (P) 82   Resp: (P) 16 SpO2: (P) 96 % O2 Device: (P)  None (Room air)    Weight: 97 lbs 0 oz    General Appearance: Resting comfortably. NAD  Respiratory: Clear to auscultation.  No respiratory distress  GI: Soft. Non-distended  Skin:  No obvious rashes or cyanosis  Other: Alert.  No edema.  Left arm is in a sling      Medical Decision Making       40 MINUTES SPENT BY ME on the date of service doing chart review, history, exam, documentation & further activities per the note.      Data   ------------------------- PAST 24 HR DATA REVIEWED -----------------------------------------------        Imaging results reviewed over the past 24 hrs:   No results found for this or any previous visit (from the past 24 hour(s)).

## 2023-05-16 NOTE — PLAN OF CARE
Goal Outcome Evaluation:         Orientation/Cognitive: A&O x 2-3; occasionally disoriented to time/place  Observation Goals (Met/ Not Met): Not met  Mobility Level/Assist Equipment: Assist of 2 with walker/gait belt to bedside commode; no weight bearing to left upper extremity; L arm sling in place  Fall Risk (Y/N):    Behavior Concerns: Yes  Pain Management: Pt denied pain overnight except for some discomfort with turning; pt able to fall asleep quickly after repositioning  Tele/VS/O2: AVSS; RA; no Tele  ABNL Lab/BG: None  Diet: Regular  Bowel/Bladder: Voiding; occasionally incontinent of urine; no BM overnight  Skin Concerns: None  Drains/Devices: LUE sling  Tests/Procedures for next shift: PT  Anticipated DC date & active delays: To be decided pending TCU placement  Patient Stated Goal for Today: Get some sleep.    Pt appeared to sleep well overnight.

## 2023-05-16 NOTE — PLAN OF CARE
Goal Outcome Evaluation: Progressing    5/16/23, 7 a to 3 p  5/13/23 admit, fall, L calicular fracture none surgical on sling    Orientation: A&O, forgetful    Vitals/Tele: VSS on rA, Tylenol scheduled for pain    IV Access/drains: PIV SL    Diet: Cardiac, no caffeine diet    Mobility: A1 GB /W    GI/: Continent most of the time, 1 BM    Wound/Skin: Redness blanchable IT    Consults:     Discharge Plan: pending placement      See Flow sheets for assessment

## 2023-05-16 NOTE — PLAN OF CARE
Goal Outcome Evaluation:      Plan of Care Reviewed With: patient  Overall Patient Progress: no change  Orientation/Cognitive: A&Ox 2-3 depending   Observation Goals (Met/ Not Met): Not Met  Mobility Level/Assist Equipment: A-2 GB/walker  Fall Risk (Y/N): Y  Behavior Concerns: None  Pain Management: Lidocaine patch in place on left shoulder, scheduled tylenol.  Tele/VS/O2: VSS on RA  ABNL Lab/BG: N/A  Diet: Cardiac Diet  Bowel/Bladder: Occasional incontinence, used bedside commode x2 this shift.  Skin Concerns: Bruising to Left shoulder and right forearm. Has sling on left arm.  Drains/Devices: PIV SL  Tests/Procedures for next shift: None  Anticipated DC date & active delays: Pending Placement  Patient Stated Goal for Today: None      Patient under contact precautions.

## 2023-05-16 NOTE — PROGRESS NOTES
Care Management Follow Up    Length of Stay (days): 0    Expected Discharge Date: 05/16/2023     Concerns to be Addressed:       Patient plan of care discussed at interdisciplinary rounds: Yes    Anticipated Discharge Disposition: Transitional Care     Anticipated Discharge Services: Transportation Services  Anticipated Discharge DME:      Patient/family educated on Medicare website which has current facility and service quality ratings: yes  Education Provided on the Discharge Plan:    Patient/Family in Agreement with the Plan: yes    Referrals Placed by CM/SW:    Private pay costs discussed: Not applicable    Additional Information:  Spoke with [facility], can accept patient, private pay costs are deposit of $3,893.82. Room would be private, no fee, due to contact precautions. The bed is available today after 1600.    SW received message from Rylan, financial counseling, who has tried to contact daughter to discuss eligibility for MA. Daughter does not have VM set up yet and Rylan has not been able to reach her.     SW attempted to call daughter to discuss acceptance at [facility], unable to leave VM.     Addendum: Received call from [facility], may need to move onto next referral for today, but can accept for tomorrow. SW to continue to attempt to reach daughter for discharge planning.     DESTINY Fregoso  Social Work  [facility]

## 2023-05-17 NOTE — PROGRESS NOTES
Care Management Discharge Note    Discharge Date: 05/17/2023       Discharge Disposition: Transitional Care    Discharge Services:     Discharge DME:      Discharge Transportation: family    Private pay costs discussed: Not applicable    Does the patient's insurance plan have a 3 day qualifying hospital stay waiver?  No    PAS Confirmation Code: [id number]  Patient/family educated on Medicare website which has current facility and service quality ratings: yes    Education Provided on the Discharge Plan:    Persons Notified of Discharge Plans: MD, daughter, HUC, Charge RN, bedside RN   Patient/Family in Agreement with the Plan: yes    Handoff Referral Completed: No    Additional Information:  Paged MD for orders. Faxed orders to [email or web address]. Spoke with daughter Angela to confirm family transport today at 1300. [facility]. Completed PAS, faxed and on chart. Faxed script to TCU.    PAS-RR    D: Per DHS regulation, SW completed and submitted PAS-RR to [place] [employer] Direct Connect via the Senior LinkAge Line.  PAS-RR confirmation # is : [id number]    I: SW spoke with family and they are aware a PAS-RR has been submitted.  SW reviewed with family that they may be contacted for a follow up appointment within 10 days of hospital discharge if their SNF stay is < 30 days.  Contact information for [facility] LinkAge Line was also provided.    A: Family verbalized understanding.    P: Further questions may be directed to [facility] LinkAge Line at #[phone], option #4 for PAS-RR staff.    DESTINY Fregoso  Social Work  [facility]

## 2023-05-17 NOTE — PLAN OF CARE
Goal Outcome Evaluation:         Date&Time: 5/16/23 [id number]  Orientation/Cognitive: A&O x 2-3; occasionally disoriented to time/place  Observation Goals (Met/ Not Met): Not met  Mobility Level/Assist Equipment: Assist of 2 with walker/gait belt to bedside commode; no weight bearing to left upper extremity; L arm sling in place  Fall Risk (Y/N):    Behavior Concerns: Yes  Pain Management: Pt denied pain overnight except for some discomfort with turning; pt able to fall asleep quickly after repositioning  Tele/VS/O2: AVSS; RA; no Tele  ABNL Lab/BG: None  Diet: Regular  Bowel/Bladder: Voiding; occasionally incontinent of urine; no BM overnight  Skin Concerns: None  Drains/Devices: LUE sling  Tests/Procedures for next shift: PT  Anticipated DC date & active delays: To be decided pending TCU placement  Patient Stated Goal for Today: Get some sleep.    Pt appeared to sleep well overnight.

## 2023-05-17 NOTE — PLAN OF CARE
Goal Outcome Evaluation:          [id number] Left clavicle fracture.  Orientation/Cognitive: A&O x 2-3, disoriented to time/place  Observation Goals (Met/ Not Met): Not met  Mobility Level/Assist Equipment: Assist 2 GB/walker, L arm sling in place  Fall Risk (Y/N):  yes  Behavior Concerns: none  Pain Management: Denies ex some discomfort with reposition and activity. Schedule tylenol and lidocaine patch at L shoulder.   Tele/VS/O2: AVSS; RA; no Tele  ABNL Lab/BG: None  Diet: Regular  Bowel/Bladder: incontinent Urine. No BM.  Skin Concerns: Bruised on L shoulder.  Drains/Devices: PIV SL  Tests/Procedures for next shift: none  Anticipated DC date & active delays: Tomorrow 5/17 to [facility] TCU can accept pt after 1300 per SW notes. SW following.   Patient Stated Goal for Today: Get some sleep.    Observation goals  PRIOR TO DISCHARGE        Comments: -diagnostic tests and consults completed and resulted - not met  -vital signs normal or at patient baseline - met  Nurse to notify provider when observation goals have been met and patient is ready for discharge.

## 2023-05-17 NOTE — PROGRESS NOTES
Observation goals  PRIOR TO DISCHARGE        Comments: -diagnostic tests and consults completed and resulted - not met  -vital signs normal or at patient baseline - met  Nurse to notify provider when observation goals have been met and patient is ready for discharge.

## 2023-05-17 NOTE — DISCHARGE SUMMARY
[facility]    Discharge Summary  Hospitalist    Date of Admission:  5/13/2023  Date of Discharge:  5/17/2023  Discharging Provider: Trice Bateman,     Discharge Diagnoses   Fall with acute comminuted left clavicle fracture  T12 compression fracture, possibly chronic  Possible pulmonary edema on CXR  Moderate-severe AS  HFpEF, without acute exacerbation this stay  Troponin elevation, suspect demand ischemia (type 2 NSTEMI)  GERD with hiatal hernia.  Spinal stenosis. H/o sacral nerve stimulator, apparently inactive  Stroke history  RLS  OA  Osteoporosis  H/o urinary retention     History of Present Illness   Breanna Her is a 89-year-old female patient history including aortic stenosis; CHF; spinal stenosis; stroke history; and osteoporosis; who presents after suffering a fall and found to have suffered an acute comminuted left clavicle fracture.     On initial evaluation was afebrile, hypertensive.  ECG showed sinus rhythm without acute ischemic changes. Labs notable for BMP which was unremarkable; CBC unremarkable; N-terminal proBNP 1084, troponin 23. CXR showed showed interval increase in pulmonary vascular congestion and interstitial opacities in the lower lungs compatible with edema; partially visualized oblique fracture middle third left clavicle with superior apex angulation; no pneumothorax.  Left shoulder x-ray showed acute comminuted fracture of the left lateral clavicle with moderate-sized butterfly fragment that demonstrated rotation and displacement. Pelvis/hip x-ray was negative for acute fracture.  Lumbar spine x-rays showed mild to moderate superior endplate compression fracture at T12 age-indeterminate and other chronic findings. Cervical spine CT was negative for acute fracture. Head CT was negative for acute findings.    Hospital Course   Breanna Her was admitted on 5/13/2023.  The following problems were addressed during her hospitalization:    Fall  with acute comminuted left clavicle fracture  T12 compression fracture, possibly chronic  * Initial presentation as above.  * Seen by ortho this stay, non-operative mgmt recommended.   * Sx well managed with Tylenol 1000 mg TID; lidocaine patch and prn Tramadol.   * PT recommended TCU stay, patient/family in agreement.     Possible pulmonary edema on CXR  Moderate-severe AS  HFpEF, without acute exacerbation this stay  Troponin elevation, suspect demand ischemia (type 2 NSTEMI)  * Echo 11/2019 showed LVEF 70-75%, grade 1 diastolic dysfunction; probably bicuspid AV with moderate AS, mild-moderate AR; MS, mild MR; moderate-severe TR; ascending aorta 3.9 cm.   * Initial presentation as above. CXR showed interval increase in pulmonary vascular congestion and interstitial opacities in the lower lungs compatible with edema; partially visualized oblique fracture middle third left clavicle with superior apex angulation; no pneumothorax. Pt not hypoxic, no dyspnea or chest pain, did not appear volume overloaded or in acute CHF on exam.     GERD with hiatal hernia.  * Chronic and stable on PPI.     Other chronic medical issues  Spinal stenosis, with hx of sacral nerve stimulator, apparently inactive  Stroke history  RLS  OA  Osteoporosis  H/o urinary retention     Trice Bateman, DO    Code Status   DNR / DNI       Primary Care Physician   Kelly Armas    Physical Exam   Temp: 97.2  F (36.2  C) Temp src: Oral BP: 134/79 Pulse: 85   Resp: 16 SpO2: 95 % O2 Device: None (Room air)    Vitals:    05/13/23 1634 05/13/23 2144   Weight: 47.6 kg (105 lb) 44 kg (97 lb)     Vital Signs with Ranges  Temp:  [97.2  F (36.2  C)-98  F (36.7  C)] 97.2  F (36.2  C)  Pulse:  [78-92] 85  Resp:  [16] 16  BP: (118-134)/(65-79) 134/79  SpO2:  [94 %-96 %] 95 %  I/O last 3 completed shifts:  In: 400 [P.O.:400]  Out: -     General: Resting comfortably, NAD  CVS: HRRR, +SM, no LE edema  Respiratory: CTAB, no wheeze/rales/rhonchi, no  increased work of breathing  GI: S, NT, ND, +BS  Skin: Warm/dry    Discharge Disposition   Discharged to TCU  Condition at discharge: Stable    Consultations This Hospital Stay   ORTHOPEDIC SURGERY IP CONSULT  PHYSICAL THERAPY ADULT IP CONSULT  OCCUPATIONAL THERAPY ADULT IP CONSULT  CARE MANAGEMENT / SOCIAL WORK IP CONSULT    Time Spent on this Encounter   Trice HAMMOND DO, personally saw the patient today and spent greater than 30 minutes discharging this patient.    Discharge Orders      Follow Up and recommended labs and tests    Follow-up with Dr. Fernando Cleveland ([facility]) in 2-3 weeks for a recheck and repeat x-rays of your left clavicle fracture. No need for follow up labs or testing prior to this appointment.     Please contact Dr. Cleveland's care coordinator, Ha, at [phone] (ext [phone]) to schedule or for any questions related to your orthopedic injury/surgery.    Main number [phone]  Fax [phone]     Activity - Up with assistive device     Activity - Up with nursing assistance     Weight bearing status    No lifting more than 1-2 pounds with the left upper extremity. Okay for use of walker if tolerated. Okay for gentle Codman's exercises when tolerable, encourage elbow, wrist, and digit ROM. Sling for comfort, okay to remove for hygiene and gentle ROM.     General info for SNF    Length of Stay Estimate: Short Term Care: Estimated # of Days <30  Condition at Discharge: Stable  Level of care:skilled   Rehabilitation Potential: Fair  Admission H&P remains valid and up-to-date: Yes  Recent Chemotherapy: N/A  Use Nursing Home Standing Orders: Yes     Mantoux instructions    Give two-step Mantoux (PPD) Per Facility Policy Yes     Follow Up and recommended labs and tests    Follow up with Nursing home physician.  No follow up labs or test are needed.     Reason for your hospital stay    Fall, clavicle fracture     Activity - Up with nursing assistance     Physical Therapy  Adult Consult    Evaluate and treat as clinically indicated.    Reason: Weakness     Occupational Therapy Adult Consult    Evaluate and treat as clinically indicated.    Reason: Weakness     Contact Isolation     Fall precautions     Fall precautions     Diet    Follow this diet upon discharge: Orders Placed This Encounter      Regular diet     Discharge Medications   Current Discharge Medication List      START taking these medications    Details   acetaminophen (TYLENOL) 500 MG tablet Take 2 tablets (1,000 mg) by mouth 3 times daily    Associated Diagnoses: Closed displaced fracture of shaft of left clavicle, initial encounter      Lidocaine (LIDOCARE) 4 % Patch Place 1 patch onto the skin every 24 hours for 10 days To prevent lidocaine toxicity, patient should be patch free for 12 hrs daily.  Qty: 10 patch, Refills: 0    Associated Diagnoses: Closed displaced fracture of shaft of left clavicle, initial encounter      senna-docusate (SENOKOT-S/PERICOLACE) 8.6-50 MG tablet Take 1 tablet by mouth 2 times daily as needed for constipation    Associated Diagnoses: Closed displaced fracture of shaft of left clavicle, initial encounter      traMADol (ULTRAM) 50 MG tablet Take 0.5 tablets (25 mg) by mouth every 6 hours as needed for severe pain  Qty: 10 tablet, Refills: 0    Associated Diagnoses: Closed displaced fracture of shaft of left clavicle, initial encounter         CONTINUE these medications which have NOT CHANGED    Details   Omeprazole (PRILOSEC PO) Take 20 mg by mouth daily as needed           Allergies   Allergies   Allergen Reactions     Pcn [Penicillins]      Data   Most Recent 3 CBC's:Recent Labs   Lab Test 05/14/23  0710 05/13/23  1649 05/08/18  1622   WBC 8.6 9.7 6.2   HGB 11.7 14.8 11.3*   MCV 99 101* 97    156 180      Most Recent 3 BMP's:  Recent Labs   Lab Test 05/14/23  0710 05/13/23  1649 05/08/18  1622    138 140   POTASSIUM 4.0 5.0 4.7   CHLORIDE 107 102 109   CO2 23 26 27   BUN 19.2  19.3 19   CR 0.70 0.63 0.78   ANIONGAP 10 10 4   TASIA 8.5 8.9 8.8   GLC 98 116* 86       Results for orders placed or performed during the hospital encounter of 05/13/23   CT Head w/o Contrast    Narrative    EXAM: CT HEAD W/O CONTRAST  LOCATION: [facility]  DATE/TIME: 5/13/2023 5:31 PM CDT    INDICATION: Traumatic injury  COMPARISON: None.  TECHNIQUE: Routine CT Head without IV contrast. Multiplanar reformats. Dose reduction techniques were used.    FINDINGS:  INTRACRANIAL CONTENTS: No intracranial hemorrhage, extraaxial collection, or mass effect.  No CT evidence of acute infarct. Severe presumed chronic small vessel ischemic changes. Mild generalized volume loss. No hydrocephalus.     VISUALIZED ORBITS/SINUSES/MASTOIDS: Prior bilateral cataract surgery. Visualized portions of the orbits are otherwise unremarkable. No paranasal sinus mucosal disease. No middle ear or mastoid effusion.    BONES/SOFT TISSUES: No acute abnormality.      Impression    IMPRESSION:  1.  No CT evidence for acute intracranial process.  2.  Brain atrophy and presumed chronic microvascular ischemic changes as above.   CT Cervical Spine w/o Contrast    Narrative    EXAM: CT CERVICAL SPINE W/O CONTRAST  LOCATION: [facility]  DATE/TIME: 5/13/2023 5:30 PM CDT    INDICATION: Traumatic injury  COMPARISON: None.  TECHNIQUE: Routine CT Cervical Spine without IV contrast. Multiplanar reformats. Dose reduction techniques were used.    FINDINGS:  VERTEBRA: Exaggeration of the cervical spine lordosis. Stepwise trace anterolisthesis of the lower cervical and upper thoracic spine, likely degenerative in etiology. Diffuse bony demineralization. No evidence of an acute displaced fracture.     CANAL/FORAMINA: Multilevel degenerative changes of the cervical spine, with mild to moderate canal stenosis at C5-C6 and at least mild stenosis at C6-C7. Mild bilateral neural foraminal narrowing.    PARASPINAL: No  prevertebral hematoma. Left clavicle fracture.      Impression    IMPRESSION:  1.  No evidence of an acute displaced fracture of the cervical spine.   XR Shoulder Left 3 Views    Narrative    EXAM: XR SHOULDER LEFT G/E 3 VIEWS  LOCATION: [facility]  DATE/TIME: 5/13/2023 5:24 PM CDT    INDICATION: Left shoulder pain after trauma.  COMPARISON: None.      Impression    IMPRESSION:  1.  Acute comminuted fracture of the left lateral clavicle. This includes a moderate-sized butterfly fragment, which demonstrates rotation and displacement.  2.  Normal glenohumeral alignment.  3.  Bone demineralization.  4.  Coarse interstitial opacities in both lungs.   XR Elbow Left 2 Views    Narrative    EXAM: XR ELBOW LEFT 2 VIEWS  LOCATION: [facility]  DATE/TIME: 5/13/2023 5:26 PM CDT    INDICATION: Left elbow pain after trauma.  COMPARISON: None.      Impression    IMPRESSION: Normal joint spaces and alignment. No fracture or joint effusion. Bone demineralization.   XR Pelvis and Hip Left 2 Views    Narrative    EXAM: XR PELVIS AND HIP LEFT 2 VIEWS  LOCATION: [facility]  DATE/TIME: 5/13/2023 5:25 PM CDT    INDICATION: Pelvic and left hip pain after trauma.  COMPARISON: None.      Impression    IMPRESSION:  1.  No fracture or joint malalignment.  2.  Mild left hip degenerative arthrosis.  3.  Bone demineralization.  4.  Advanced degenerative changes in the lower lumbar spine noted.  5.  Right ureteral stent.  6.  Stimulator device projecting over the right pelvis, with lead projecting over the sacrum.   XR Lumbar Spine 2/3 Views    Narrative    EXAM: XR LUMBAR SPINE 2/3 VIEWS  LOCATION: [facility]  DATE/TIME: 5/13/2023 5:25 PM CDT    INDICATION: Trauma.  COMPARISON: None.      Impression    IMPRESSION: On the frontal view, image quality is significantly degraded by overlapping ribs, obscuring the upper lumbar spine. Mild rightward  scoliosis of the upper lumbar spine and levoscoliosis centered at L4-L5. Mild anterior spondylolisthesis at   L3-L4. Otherwise normal alignment.    Mild to moderate superior endplate compression fracture at T12, age-indeterminate. Otherwise normal vertebral body heights. Severe disc space narrowing at L2-L3 and L3-L4 with probable disc space ankylosis. Moderate to severe disc space narrowing at   other levels. Moderate facet arthropathy. The visualized sacrum and pelvis are unremarkable. Right-sided ureteral stent. Transsacral stimulator lead.   XR Chest 1 View    Narrative    EXAM: XR CHEST 1 VIEW  LOCATION: [facility]  DATE/TIME: 5/13/2023 5:22 PM CDT    INDICATION: Trauma with pain.  COMPARISON: 05/08/2018      Impression    IMPRESSION: Cardiac enlargement with interval increase in pulmonary vascular congestion. Interstitial opacities in lower lungs compatible with edema. Findings compatible with component of CHF/volume overload. Aortic calcification. Increased opacities in   the lung bases likely relating to atelectasis. No definitive evidence for pneumothorax. Partially visualized oblique fracture middle third left clavicle with the superior apex angulation. Marked degenerative changes right shoulder.

## 2023-05-17 NOTE — PLAN OF CARE
Goal Outcome Evaluation:    Discharge Note    Patient discharged to Assisted Living via private vehicle  accompanied by daughter.  IV: Discontinued  Prescriptions printed and given to patient/family.   Belongings reviewed and sent with patient and family.   Home medications returned to patient: NA  Equipment sent with: N/A.   patient and family verbalizes understanding of discharge instructions. AVS given to patient and family.      Pt A&O x4, slow and soft spoken. Pain to LUE (shoulder) 5/10, given tylenol. Pt was dressed by NA and family will transport to [facility].

## 2023-05-18 NOTE — PLAN OF CARE
Physical Therapy Discharge Summary    Reason for therapy discharge:    Discharged to transitional care facility.    Progress towards therapy goal(s). See goals on Care Plan in [facility] electronic health record for goal details.  Goals partially met.  Barriers to achieving goals:   discharge from facility.    Therapy recommendation(s):    Continued therapy is recommended.  Rationale/Recommendations: Pt is below baseline. Pt currently requiring heavy assist with all functional mobility. Pt presents with deficits in activity tolerance, balance, and strength. Due to these deficits, pt is a high falls risk and unable to currently ambulate. Pt would benefit from continued skilled PT services via TCU to address deficits and improve IND with safety and functional mobility.  PT Brief overview of current status: Supine>sit w/ min A x 1; sit>stand w/ Min A x1, short ambulation w/ quad cane and Mod A x1          Recommendation above provided by last treating therapist.

## 2023-07-02 NOTE — DISCHARGE INSTRUCTIONS
Please follow-up with your primary care provider regarding pain management and for reassessment within 1 week.  I recommend continuing ibuprofen/Tylenol for pain at home.    If symptoms worsen or cannot be controlled at home, please return to ER.

## 2023-07-02 NOTE — ED PROVIDER NOTES
Emergency Department Attending Supervision Note  7/2/2023  4:41 PM      I evaluated this patient in conjunction with Mary Carmen Street PA-C    History     Chief Complaint:  Fall       HPI   Breanna Her is a 89 year old female who cannot provide history as she is quite demented.  Reports from EMS and the daughter relay a fall out of her wheelchair when transferring from wheelchair to bed.  Complaint is of left shoulder pain.  Of note she had a recent clavicle fracture approximately 6 weeks ago and the pain is in this area.  They deny that she struck her head or loss consciousness.  She is not anticoagulated.      Independent Historian:   EMS - They report The above and Daughter - They report The above    Review of External Notes:   Previous hospitalization and radiographs were reviewed      Medications:    acetaminophen (TYLENOL) 500 MG tablet  Omeprazole (PRILOSEC PO)  senna-docusate (SENOKOT-S/PERICOLACE) 8.6-50 MG tablet  traMADol (ULTRAM) 50 MG tablet        Past Medical History:    Past Medical History:   Diagnosis Date     Congestive heart failure, unspecified      Esophageal reflux      Fecal incontinence      Herpes zoster with other nervous system complications(053.19)      Osteoarthrosis, unspecified whether generalized or localized, lower leg      Osteoporosis, unspecified      Restless leg      Self-catheterises urinary bladder      Spinal stenosis, lumbar region, without neurogenic claudication      Stroke (H)      Unspecified essential hypertension      Unspecified urinary incontinence      UTI (lower urinary tract infection)        Past Surgical History:    Past Surgical History:   Procedure Laterality Date     APPENDECTOMY       CATARACT IOL, RT/LT       CHOLECYSTECTOMY       CYSTOCELE REPAIR       CYSTOSCOPY, BIOPSY BLADDER, COMBINED N/A 1/7/2015    Procedure: COMBINED CYSTOSCOPY, BIOPSY BLADDER;  Surgeon: Aysha Noonan MD;  Location:  OR     CYSTOSCOPY, RETROGRADES, COMBINED Bilateral  1/7/2015    Procedure: COMBINED CYSTOSCOPY, RETROGRADES;  Surgeon: Aysha Noonan MD;  Location: [facility] OR     GENITOURINARY SURGERY      INTERSTIM IMPLANT     HYSTERECTOMY       IMPLANT STIMULATOR AND LEADS SACRAL NERVE (STAGE ONE AND TWO)  4/10/2013    Procedure: IMPLANT STIMULATOR AND LEADS SACRAL NERVE (STAGE ONE AND TWO);  AND REMOVAL OF EXISTING INTERSTIM SYSTEM.;  Surgeon: Aysha Noonan MD;  Location: [facility] OR     LAMINECTOMY LUMBAR ONE LEVEL          Physical Exam     Patient Vitals for the past 24 hrs:   BP Temp Temp src Pulse Resp SpO2   07/02/23 1647 (!) 174/89 98.2  F (36.8  C) Temporal 83 16 94 %        Physical Exam  General: Alert, pleasantly demented  Head:  Scalp is atraumatic  Eyes:  The pupils are equal, round, and reactive to light    EOM's intact    No scleral icterus  ENT:      Nose:  The external nose is normal  Ears:  External ears are normal     Neck:  Normal range of motion.      There is no rigidity.    Trachea is in the midline         CV:  Regular rate and rhythm    3 out of 6 systolic murmur   Resp:  Breath sounds are clear bilaterally    Non-labored, no retractions or accessory muscle use      GI:  Abdomen is soft, no distension, no tenderness.       MS:  Normal strength in all 4 extremities, No midline cervical tenderness, left clavicle tenderness  Skin:  Warm and dry, No rash or lesions noted.  Neuro: Strength 5/5 x4.  Sensation intact  In all 4 extremities.          Emergency Department Course   Imaging:  Ribs XR, unilat 3 views + PA chest,  left   Final Result   IMPRESSION:    1.  Acute fracture of the left clavicle mid diaphysis.   2.  Nondisplaced fracture of the left anterior third rib.   3.  Cardiomegaly. Small bilateral pleural effusions. Interstitial opacities in both lungs. Overall, these findings are consistent with volume overload.   4.  Mild atelectasis in the left lung base.   5.  Probable gastroesophageal hiatal hernia.      Clavicle XR, left   Final Result    IMPRESSION:    1.  Acute fracture of the middle third of the left clavicle diaphysis. There is one cortical width superior displacement of the distal fragment in relationship to the proximal as well as mild superior apex angulation. No additional fractures are evident.   2.  Normal joint alignment.   3.  Thin interstitial opacities in both lungs.         Report per radiology    Laboratory:  Labs Ordered and Resulted from Time of ED Arrival to Time of ED Departure - No data to display   Emergency Department Course & Assessments:  Interventions:  Medications - No data to display   Independent Interpretation (X-rays, CTs, rhythm strip):  Patient's left clavicle radiograph is reviewed and demonstrates fracture, no dramatic change when compared to her previous    Consultations/Discussion of Management or Tests:     ED Course as of 07/02/23 1812   Sun Jul 02, 2023   1648 Performed initial assessment   1651 Spoke with patient's daughter and son in the room.   1809 Consulted with [facility] regarding patient's clavicle fracture - appears chronic       Social Determinants of Health affecting care:   None    Disposition:  The patient was discharged to home.     Impression & Plan    Medical Decision Making:  Patient presents via EMS from her care facility.  Daughter arrived and assisted with history.  Findings are consistent with a left rib fracture as well as reinjury to her left clavicle.  Daughter has relayed that selective cares are not desired.  There is no signs of acute intracranial hemorrhage or spinal fracture.  Otherwise head to toe trauma examinations unremarkable.  They feel comfortable with her discharge back to her care facility.  She was discharged with an incentive spirometer.    Diagnosis:    ICD-10-CM    1. Fall, initial encounter  W19.XXXA       2. Closed fracture of one rib of left side, initial encounter  S22.32XA            Discharge Medications:  New Prescriptions    No medications on file           Hero Painting MD  7/2/2023   Trigger, Hero Rahman,*      Trigger, Hero Rahman MD  07/02/23 8561

## 2023-07-02 NOTE — ED TRIAGE NOTES
Patient BIBA from [facility] in [place]. Patient had a mechanical fall transferring from the bed to the wheelchair at an unknown time. States hx of left clavicle fracture 5/13 and had reinjury with all tonight. DNR/DNI with selective treatments.      Triage Assessment     Row Name 07/02/23 1639       Triage Assessment (Adult)    Airway WDL WDL       Respiratory WDL    Respiratory WDL WDL       Skin Circulation/Temperature WDL    Skin Circulation/Temperature WDL WDL       Cardiac WDL    Cardiac WDL WDL       Peripheral/Neurovascular WDL    Peripheral Neurovascular WDL WDL       Cognitive/Neuro/Behavioral WDL    Cognitive/Neuro/Behavioral WDL WDL

## 2023-07-02 NOTE — ED PROVIDER NOTES
History     Chief Complaint:  Fall       HPI   Breanna Her is a 89 year old female who presents with mechanical fall and left clavicle pain.  Patient has history of recent clavicle fracture from a fall earlier this year in May. Today, EMS reports that patient was being transferred from bed to chair and fell, sustaining left upper chest wall pain.  Denies hitting her head, no loss of consciousness, she is not anticoagulated.  Denies headache, vision changes, nausea, vomiting, confusion.       Independent Historian:   None - Patient Only    Review of External Notes:   Reviewed discharge summary from 5/17/2023      Medications:    acetaminophen (TYLENOL) 500 MG tablet  Omeprazole (PRILOSEC PO)  senna-docusate (SENOKOT-S/PERICOLACE) 8.6-50 MG tablet  traMADol (ULTRAM) 50 MG tablet        Past Medical History:    Past Medical History:   Diagnosis Date     Congestive heart failure, unspecified      Esophageal reflux      Fecal incontinence      Herpes zoster with other nervous system complications(053.19)      Osteoarthrosis, unspecified whether generalized or localized, lower leg      Osteoporosis, unspecified      Restless leg      Self-catheterises urinary bladder      Spinal stenosis, lumbar region, without neurogenic claudication      Stroke (H)      Unspecified essential hypertension      Unspecified urinary incontinence      UTI (lower urinary tract infection)        Past Surgical History:    Past Surgical History:   Procedure Laterality Date     APPENDECTOMY       CATARACT IOL, RT/LT       CHOLECYSTECTOMY       CYSTOCELE REPAIR       CYSTOSCOPY, BIOPSY BLADDER, COMBINED N/A 1/7/2015    Procedure: COMBINED CYSTOSCOPY, BIOPSY BLADDER;  Surgeon: Aysha Noonan MD;  Location:  OR     CYSTOSCOPY, RETROGRADES, COMBINED Bilateral 1/7/2015    Procedure: COMBINED CYSTOSCOPY, RETROGRADES;  Surgeon: Aysha Noonan MD;  Location:  OR     GENITOURINARY SURGERY      INTERSTIM IMPLANT     HYSTERECTOMY        IMPLANT STIMULATOR AND LEADS SACRAL NERVE (STAGE ONE AND TWO)  4/10/2013    Procedure: IMPLANT STIMULATOR AND LEADS SACRAL NERVE (STAGE ONE AND TWO);  AND REMOVAL OF EXISTING INTERSTIM SYSTEM.;  Surgeon: Aysha Noonan MD;  Location: [facility] OR     LAMINECTOMY LUMBAR ONE LEVEL          Physical Exam     Patient Vitals for the past 24 hrs:   BP Temp Temp src Pulse Resp SpO2   07/02/23 1647 (!) 174/89 98.2  F (36.8  C) Temporal 83 16 94 %        Physical Exam  General: Alert and cooperative with exam. Patient in no apparent distress. Normal mentation. Holding left arm adducted across abdomen  Head:  Scalp is NC/AT  Eyes:  No scleral icterus, PERRL  ENT:  The external nose and ears are normal.  Neck:  Normal range of motion without rigidity.  CV:  Murmur noted on auscultation     Left clavicular pain with palpation  Resp:  Breath sounds are clear bilaterally    Non-labored, no retractions or accessory muscle use    Pain to left chest wall with inspiration  GI:  Abdomen is soft, no distension, no tenderness. No peritoneal signs  MS:  No lower extremity edema   Skin:  Warm and dry, No rash or lesions noted.  Neuro:  Oriented x 3. No gross motor deficits.      Emergency Department Course     Imaging:  Ribs XR, unilat 3 views + PA chest,  left   Final Result   IMPRESSION:    1.  Acute fracture of the left clavicle mid diaphysis.   2.  Nondisplaced fracture of the left anterior third rib.   3.  Cardiomegaly. Small bilateral pleural effusions. Interstitial opacities in both lungs. Overall, these findings are consistent with volume overload.   4.  Mild atelectasis in the left lung base.   5.  Probable gastroesophageal hiatal hernia.      Clavicle XR, left   Final Result   IMPRESSION:    1.  Acute fracture of the middle third of the left clavicle diaphysis. There is one cortical width superior displacement of the distal fragment in relationship to the proximal as well as mild superior apex angulation. No additional  fractures are evident.   2.  Normal joint alignment.   3.  Thin interstitial opacities in both lungs.         Report per radiology    Laboratory:  Labs Ordered and Resulted from Time of ED Arrival to Time of ED Departure - No data to display     Procedures   None    Emergency Department Course & Assessments:      Interventions:  Medications - No data to display     Independent Interpretation (X-rays, CTs, rhythm strip):  Clavicle xray - displaced clavicle fracture, however appears unchanged from previous (05/2023)  Rib xray - left 3rd rib fracture    Consultations/Discussion of Management or Tests:  ED Course as of 07/02/23 1809   Sun Jul 02, 2023   1648 Performed initial assessment   1651 Spoke with patient's daughter and son in the room.   1809 Consulted with [facility] regarding patient's clavicle fracture - appears chronic       Social Determinants of Health affecting care:   None    Disposition:  The patient was discharged to home.     Impression & Plan      Medical Decision Making:  Breanna Her is a 89 year old female who presents with mechanical fall and left clavicular pain. Upon exam, patient has notable pain with palpation along left clavicle, however no pain to shoulder or upper extremity. With deep inspiration, patient also complains of left sided rib pain. Imaging of clavicle and rib completed. Clavicle fracture appears unchanged from imaging completed in May 2023.  Rib imaging does indicate a left third anterior rib fracture, likely acute today. While here, she has declined pain medication for symptomatic control stating she is not in pain when sitting. She will be sent home with spirometry supplies and instructions. Recommend she follow up with her PCP in one week for reassessment of her symptoms. If pain worsens and cannot be controlled at home, instructed her to return to ER.       Diagnosis:    ICD-10-CM    1. Fall, initial encounter  W19.XXXA       2. Closed fracture of one rib of  left side, initial encounter  S22.32XA            Discharge Medications:  New Prescriptions    No medications on file          7/2/2023   ANNITA Rosas Lauren R, PA-C  07/02/23 1806

## 2023-07-02 NOTE — ED NOTES
Bed: ED01  Expected date:   Expected time:   Means of arrival:   Comments:  ealth 1825 89 F mechanical fall no thinners possible clavicle re injury eta 1615